# Patient Record
Sex: FEMALE | Race: WHITE | NOT HISPANIC OR LATINO
[De-identification: names, ages, dates, MRNs, and addresses within clinical notes are randomized per-mention and may not be internally consistent; named-entity substitution may affect disease eponyms.]

---

## 2017-08-16 ENCOUNTER — APPOINTMENT (OUTPATIENT)
Dept: OBGYN | Facility: CLINIC | Age: 57
End: 2017-08-16
Payer: COMMERCIAL

## 2017-08-16 VITALS
BODY MASS INDEX: 33.66 KG/M2 | HEART RATE: 80 BPM | HEIGHT: 63 IN | SYSTOLIC BLOOD PRESSURE: 142 MMHG | WEIGHT: 190 LBS | DIASTOLIC BLOOD PRESSURE: 80 MMHG

## 2017-08-16 PROCEDURE — 82270 OCCULT BLOOD FECES: CPT

## 2017-08-16 PROCEDURE — 99396 PREV VISIT EST AGE 40-64: CPT

## 2017-08-18 ENCOUNTER — MESSAGE (OUTPATIENT)
Age: 57
End: 2017-08-18

## 2017-08-18 LAB — HPV HIGH+LOW RISK DNA PNL CVX: NEGATIVE

## 2017-08-20 ENCOUNTER — MESSAGE (OUTPATIENT)
Age: 57
End: 2017-08-20

## 2017-08-20 LAB — CYTOLOGY CVX/VAG DOC THIN PREP: NORMAL

## 2017-08-23 ENCOUNTER — APPOINTMENT (OUTPATIENT)
Dept: OBGYN | Facility: CLINIC | Age: 57
End: 2017-08-23
Payer: COMMERCIAL

## 2017-08-23 ENCOUNTER — ASOB RESULT (OUTPATIENT)
Age: 57
End: 2017-08-23

## 2017-08-23 PROCEDURE — 76830 TRANSVAGINAL US NON-OB: CPT

## 2017-08-24 ENCOUNTER — MESSAGE (OUTPATIENT)
Age: 57
End: 2017-08-24

## 2019-04-03 ENCOUNTER — APPOINTMENT (OUTPATIENT)
Dept: OBGYN | Facility: CLINIC | Age: 59
End: 2019-04-03
Payer: COMMERCIAL

## 2019-04-03 VITALS
BODY MASS INDEX: 35.44 KG/M2 | DIASTOLIC BLOOD PRESSURE: 80 MMHG | SYSTOLIC BLOOD PRESSURE: 130 MMHG | WEIGHT: 200 LBS | HEIGHT: 63 IN

## 2019-04-03 DIAGNOSIS — Z12.11 ENCOUNTER FOR SCREENING FOR MALIGNANT NEOPLASM OF COLON: ICD-10-CM

## 2019-04-03 DIAGNOSIS — Z12.12 ENCOUNTER FOR SCREENING FOR MALIGNANT NEOPLASM OF COLON: ICD-10-CM

## 2019-04-03 PROCEDURE — 82270 OCCULT BLOOD FECES: CPT

## 2019-04-03 PROCEDURE — 99396 PREV VISIT EST AGE 40-64: CPT

## 2019-04-03 NOTE — PHYSICAL EXAM
[Awake] : awake [Alert] : alert [Acute Distress] : no acute distress [Thyroid Nodule] : no thyroid nodule [Goiter] : no goiter [Mass] : no breast mass [Nipple Discharge] : no nipple discharge [Axillary LAD] : no axillary lymphadenopathy [Soft] : soft [Tender] : non tender [Oriented x3] : oriented to person, place, and time [Normal] : uterus [No Bleeding] : there was no active vaginal bleeding [Uterine Adnexae] : were not tender and not enlarged [No Tenderness] : no rectal tenderness [Occult Blood] : occult blood test from digital rectal exam was negative [Nl Sphincter Tone] : normal sphincter tone [External Hemorrhoid] : an external hemorrhoid [RRR, No Murmurs] : RRR, no murmurs [CTAB] : CTAB

## 2019-04-05 ENCOUNTER — MESSAGE (OUTPATIENT)
Age: 59
End: 2019-04-05

## 2019-04-05 LAB — HPV HIGH+LOW RISK DNA PNL CVX: NOT DETECTED

## 2019-04-06 ENCOUNTER — MESSAGE (OUTPATIENT)
Age: 59
End: 2019-04-06

## 2019-04-06 LAB — CYTOLOGY CVX/VAG DOC THIN PREP: NORMAL

## 2023-05-03 ENCOUNTER — APPOINTMENT (OUTPATIENT)
Dept: OBGYN | Facility: CLINIC | Age: 63
End: 2023-05-03
Payer: MEDICARE

## 2023-05-03 VITALS
SYSTOLIC BLOOD PRESSURE: 122 MMHG | WEIGHT: 200 LBS | BODY MASS INDEX: 35.44 KG/M2 | HEIGHT: 63 IN | DIASTOLIC BLOOD PRESSURE: 80 MMHG

## 2023-05-03 DIAGNOSIS — Z01.419 ENCOUNTER FOR GYNECOLOGICAL EXAMINATION (GENERAL) (ROUTINE) W/OUT ABNORMAL FINDINGS: ICD-10-CM

## 2023-05-03 PROCEDURE — G0101: CPT

## 2023-05-03 PROCEDURE — 99386 PREV VISIT NEW AGE 40-64: CPT | Mod: GY

## 2023-05-04 LAB — HPV HIGH+LOW RISK DNA PNL CVX: NOT DETECTED

## 2023-05-09 LAB — CYTOLOGY CVX/VAG DOC THIN PREP: NORMAL

## 2024-08-27 ENCOUNTER — APPOINTMENT (OUTPATIENT)
Dept: CARDIOLOGY | Facility: CLINIC | Age: 64
End: 2024-08-27
Payer: MEDICARE

## 2024-08-27 DIAGNOSIS — I83.90 ASYMPTOMATIC VARICOSE VEINS OF UNSPECIFIED LOWER EXTREMITY: ICD-10-CM

## 2024-08-27 PROCEDURE — 99203 OFFICE O/P NEW LOW 30 MIN: CPT

## 2024-08-27 NOTE — REASON FOR VISIT
[Initial Evaluation] : an initial evaluation of [FreeTextEntry2] : vascular evaluation [FreeTextEntry1] : 8/27/2024  65 y/o F with PMHX Gastric Surgery For Morbid Obesity, who presents for evaluation. Smoker 1 PPD leg swelling for the past month, bilateral, now better palp pedals on exam VV on the post calf R>L skin is try not wearing compression no meds  Prior duplex in 2019 with ruptured pop cyst

## 2024-08-27 NOTE — ASSESSMENT
[FreeTextEntry1] : Assessment: 1.  Improving bilateral edema 2.  Palp pedal pulses 3.  Smoker 4.  Lung nodules  Plan 1.  Initiate conservative measures for varicose veins 2.  Trial of Copper fit compression garments 3.  Wear compression first thing in the morning, and take off before going to bed 4.  Discussed the importance of healthy diet, weight loss and leg elevation 5.  Age appropriate cancer screening with primary care 6.  Lower extremity venous duplex to rule out DVT  7.  Return in 3 months to re-assess.

## 2024-09-20 ENCOUNTER — APPOINTMENT (OUTPATIENT)
Dept: CT IMAGING | Facility: CLINIC | Age: 64
End: 2024-09-20

## 2025-05-21 ENCOUNTER — INPATIENT (INPATIENT)
Facility: HOSPITAL | Age: 65
LOS: 8 days | Discharge: NURSING, REHAB OR LONG-TERM CARE | DRG: 897 | End: 2025-05-30
Attending: HOSPITALIST | Admitting: STUDENT IN AN ORGANIZED HEALTH CARE EDUCATION/TRAINING PROGRAM
Payer: MEDICARE

## 2025-05-21 VITALS
TEMPERATURE: 97 F | OXYGEN SATURATION: 100 % | WEIGHT: 160.06 LBS | SYSTOLIC BLOOD PRESSURE: 102 MMHG | HEART RATE: 74 BPM | HEIGHT: 64 IN | RESPIRATION RATE: 18 BRPM | DIASTOLIC BLOOD PRESSURE: 68 MMHG

## 2025-05-21 LAB
ALBUMIN SERPL ELPH-MCNC: 2.5 G/DL — LOW (ref 3.3–5)
ALP SERPL-CCNC: 332 U/L — HIGH (ref 40–120)
ALT FLD-CCNC: 90 U/L — HIGH (ref 12–78)
ANION GAP SERPL CALC-SCNC: 9 MMOL/L — SIGNIFICANT CHANGE UP (ref 5–17)
APPEARANCE UR: CLEAR — SIGNIFICANT CHANGE UP
APTT BLD: 27.9 SEC — SIGNIFICANT CHANGE UP (ref 26.1–36.8)
AST SERPL-CCNC: 168 U/L — HIGH (ref 15–37)
BASOPHILS # BLD AUTO: 0.02 K/UL — SIGNIFICANT CHANGE UP (ref 0–0.2)
BASOPHILS NFR BLD AUTO: 0.3 % — SIGNIFICANT CHANGE UP (ref 0–2)
BILIRUB SERPL-MCNC: 1.6 MG/DL — HIGH (ref 0.2–1.2)
BILIRUB UR-MCNC: NEGATIVE — SIGNIFICANT CHANGE UP
BUN SERPL-MCNC: 7 MG/DL — SIGNIFICANT CHANGE UP (ref 7–23)
CALCIUM SERPL-MCNC: 9.2 MG/DL — SIGNIFICANT CHANGE UP (ref 8.5–10.1)
CHLORIDE SERPL-SCNC: 94 MMOL/L — LOW (ref 96–108)
CO2 SERPL-SCNC: 30 MMOL/L — SIGNIFICANT CHANGE UP (ref 22–31)
COLOR SPEC: SIGNIFICANT CHANGE UP
CREAT SERPL-MCNC: 0.51 MG/DL — SIGNIFICANT CHANGE UP (ref 0.5–1.3)
DIFF PNL FLD: NEGATIVE — SIGNIFICANT CHANGE UP
EGFR: 103 ML/MIN/1.73M2 — SIGNIFICANT CHANGE UP
EGFR: 103 ML/MIN/1.73M2 — SIGNIFICANT CHANGE UP
EOSINOPHIL # BLD AUTO: 0.15 K/UL — SIGNIFICANT CHANGE UP (ref 0–0.5)
EOSINOPHIL NFR BLD AUTO: 2.2 % — SIGNIFICANT CHANGE UP (ref 0–6)
ETHANOL SERPL-MCNC: <10 MG/DL — SIGNIFICANT CHANGE UP (ref 0–10)
GLUCOSE SERPL-MCNC: 85 MG/DL — SIGNIFICANT CHANGE UP (ref 70–99)
GLUCOSE UR QL: NEGATIVE MG/DL — SIGNIFICANT CHANGE UP
HCT VFR BLD CALC: 29.2 % — LOW (ref 34.5–45)
HGB BLD-MCNC: 10.6 G/DL — LOW (ref 11.5–15.5)
IMM GRANULOCYTES NFR BLD AUTO: 0.6 % — SIGNIFICANT CHANGE UP (ref 0–0.9)
KETONES UR QL: ABNORMAL MG/DL
LEUKOCYTE ESTERASE UR-ACNC: ABNORMAL
LIDOCAIN IGE QN: 43 U/L — SIGNIFICANT CHANGE UP (ref 13–75)
LYMPHOCYTES # BLD AUTO: 1.53 K/UL — SIGNIFICANT CHANGE UP (ref 1–3.3)
LYMPHOCYTES # BLD AUTO: 22.9 % — SIGNIFICANT CHANGE UP (ref 13–44)
MAGNESIUM SERPL-MCNC: 1.8 MG/DL — SIGNIFICANT CHANGE UP (ref 1.6–2.6)
MCHC RBC-ENTMCNC: 36.3 G/DL — HIGH (ref 32–36)
MCHC RBC-ENTMCNC: 39.8 PG — HIGH (ref 27–34)
MCV RBC AUTO: 109.8 FL — HIGH (ref 80–100)
MONOCYTES # BLD AUTO: 0.51 K/UL — SIGNIFICANT CHANGE UP (ref 0–0.9)
MONOCYTES NFR BLD AUTO: 7.6 % — SIGNIFICANT CHANGE UP (ref 2–14)
NEUTROPHILS # BLD AUTO: 4.44 K/UL — SIGNIFICANT CHANGE UP (ref 1.8–7.4)
NEUTROPHILS NFR BLD AUTO: 66.4 % — SIGNIFICANT CHANGE UP (ref 43–77)
NITRITE UR-MCNC: NEGATIVE — SIGNIFICANT CHANGE UP
NRBC BLD AUTO-RTO: 0 /100 WBCS — SIGNIFICANT CHANGE UP (ref 0–0)
PH UR: 7 — SIGNIFICANT CHANGE UP (ref 5–8)
PLATELET # BLD AUTO: 142 K/UL — LOW (ref 150–400)
POTASSIUM SERPL-MCNC: 3.4 MMOL/L — LOW (ref 3.5–5.3)
POTASSIUM SERPL-SCNC: 3.4 MMOL/L — LOW (ref 3.5–5.3)
PROT SERPL-MCNC: 5.4 G/DL — LOW (ref 6–8.3)
PROT UR-MCNC: NEGATIVE MG/DL — SIGNIFICANT CHANGE UP
RBC # BLD: 2.66 M/UL — LOW (ref 3.8–5.2)
RBC # FLD: 14 % — SIGNIFICANT CHANGE UP (ref 10.3–14.5)
SODIUM SERPL-SCNC: 133 MMOL/L — LOW (ref 135–145)
SP GR SPEC: 1.01 — SIGNIFICANT CHANGE UP (ref 1–1.03)
UROBILINOGEN FLD QL: >=8 MG/DL (ref 0.2–1)
WBC # BLD: 6.69 K/UL — SIGNIFICANT CHANGE UP (ref 3.8–10.5)
WBC # FLD AUTO: 6.69 K/UL — SIGNIFICANT CHANGE UP (ref 3.8–10.5)

## 2025-05-21 PROCEDURE — 93010 ELECTROCARDIOGRAM REPORT: CPT

## 2025-05-21 PROCEDURE — 99285 EMERGENCY DEPT VISIT HI MDM: CPT

## 2025-05-21 PROCEDURE — 71045 X-RAY EXAM CHEST 1 VIEW: CPT | Mod: 26

## 2025-05-21 PROCEDURE — 99223 1ST HOSP IP/OBS HIGH 75: CPT | Mod: GC

## 2025-05-21 RX ORDER — LORAZEPAM 4 MG/ML
2 VIAL (ML) INJECTION ONCE
Refills: 0 | Status: DISCONTINUED | OUTPATIENT
Start: 2025-05-21 | End: 2025-05-21

## 2025-05-21 RX ORDER — SODIUM CHLORIDE 9 G/1000ML
1000 INJECTION, SOLUTION INTRAVENOUS ONCE
Refills: 0 | Status: COMPLETED | OUTPATIENT
Start: 2025-05-21 | End: 2025-05-21

## 2025-05-21 RX ORDER — CHLORDIAZEPOXIDE HCL 10 MG
50 CAPSULE ORAL ONCE
Refills: 0 | Status: DISCONTINUED | OUTPATIENT
Start: 2025-05-21 | End: 2025-05-21

## 2025-05-21 RX ADMIN — Medication 50 MILLIGRAM(S): at 23:36

## 2025-05-21 RX ADMIN — Medication 2 MILLIGRAM(S): at 21:24

## 2025-05-21 RX ADMIN — SODIUM CHLORIDE 1000 MILLILITER(S): 9 INJECTION, SOLUTION INTRAVENOUS at 23:36

## 2025-05-21 NOTE — ED ADULT NURSE NOTE - NSFALLRISKINTERV_ED_ALL_ED
Assistance OOB with selected safe patient handling equipment if applicable/Assistance with ambulation/Communicate fall risk and risk factors to all staff, patient, and family/Monitor gait and stability/Provide visual cue: yellow wristband, yellow gown, etc/Reinforce activity limits and safety measures with patient and family/Call bell, personal items and telephone in reach/Instruct patient to call for assistance before getting out of bed/chair/stretcher/Non-slip footwear applied when patient is off stretcher/[place] to call system/Physically safe environment - no spills, clutter or unnecessary equipment/Purposeful Proactive Rounding/Room/bathroom lighting operational, light cord in reach

## 2025-05-21 NOTE — ED PROVIDER NOTE - OBJECTIVE STATEMENT
[Consultation] : a consultation visit [Hypothyroidism] : hypothyroidism 65 F denies significant PMHx, poor compliance with medical care, hx c-sections, gastric bypass >20 years ago, 1ppd smoker, daily etoh (last drink yesterday, tenisha but more recently beer), presents to ED due to BLE swelling and weakness. Denies f/c, cp, sob, vomiting, diarrhea, urinary complaints. Reports was seen @ [facility] Nov 2024 but unable to specify what was found or done.

## 2025-05-21 NOTE — ED ADULT TRIAGE NOTE - CHIEF COMPLAINT QUOTE
BIBA from home per EMS pt with increased b/l lower extremity edema x 1 month with accompanied weakness

## 2025-05-21 NOTE — ED PROVIDER NOTE - ATTENDING APP SHARED VISIT CONTRIBUTION OF CARE
This was a shared visit with NOE. I reviewed and verified the documentation and independently performed the documented MDM.

## 2025-05-21 NOTE — ED PROVIDER NOTE - NEUROLOGICAL, MLM
awake, alert, nonfocal, tremulous Partially impaired: cannot see medication labels or newsprint, but can see obstacles in path, and the surrounding layout; can count fingers at arm's length

## 2025-05-21 NOTE — ED PROVIDER NOTE - CLINICAL SUMMARY MEDICAL DECISION MAKING FREE TEXT BOX
Here with lower extremity edema in setting of alcohol abuse and low interaction with medical field.  Differential inclusive of CHF, kidney/liver failure, alcohol withdrawal, malnutrition with low albumin, fluid overload.  Check labs, x-ray, give benzos with withdrawal, anticipate admission.

## 2025-05-21 NOTE — ED ADULT NURSE NOTE - OBJECTIVE STATEMENT
Pt Stated she has been feeling very tired, and very shaky with left chest wall palpitations for 6 months. Denies Chest pain or sob. Denies fever or chills. denies nausea or vomiting. "drank a little alcohol today"

## 2025-05-21 NOTE — ED PROVIDER NOTE - PROGRESS NOTE DETAILS
LFTs abnormal, concern for cholesterol and liver failure.  Will admit for further workup and management.  Still with some shaking will give Librium.  Patient also with soft blood pressure, will give fluids.

## 2025-05-22 DIAGNOSIS — Z29.9 ENCOUNTER FOR PROPHYLACTIC MEASURES, UNSPECIFIED: ICD-10-CM

## 2025-05-22 DIAGNOSIS — F10.939 ALCOHOL USE, UNSPECIFIED WITH WITHDRAWAL, UNSPECIFIED: ICD-10-CM

## 2025-05-22 DIAGNOSIS — R60.0 LOCALIZED EDEMA: ICD-10-CM

## 2025-05-22 DIAGNOSIS — F10.239 ALCOHOL DEPENDENCE WITH WITHDRAWAL, UNSPECIFIED: ICD-10-CM

## 2025-05-22 LAB — NT-PROBNP SERPL-SCNC: 614 PG/ML — HIGH (ref 0–125)

## 2025-05-22 PROCEDURE — 93970 EXTREMITY STUDY: CPT | Mod: 26

## 2025-05-22 PROCEDURE — 99232 SBSQ HOSP IP/OBS MODERATE 35: CPT

## 2025-05-22 RX ORDER — DIAZEPAM 5 MG/1
2 TABLET ORAL EVERY 12 HOURS
Refills: 0 | Status: DISCONTINUED | OUTPATIENT
Start: 2025-05-24 | End: 2025-05-25

## 2025-05-22 RX ORDER — LORAZEPAM 4 MG/ML
4 VIAL (ML) INJECTION
Refills: 0 | Status: DISCONTINUED | OUTPATIENT
Start: 2025-05-22 | End: 2025-05-22

## 2025-05-22 RX ORDER — FOLIC ACID 1 MG/1
1 TABLET ORAL DAILY
Refills: 0 | Status: DISCONTINUED | OUTPATIENT
Start: 2025-05-22 | End: 2025-05-30

## 2025-05-22 RX ORDER — ENOXAPARIN SODIUM 100 MG/ML
40 INJECTION SUBCUTANEOUS EVERY 24 HOURS
Refills: 0 | Status: DISCONTINUED | OUTPATIENT
Start: 2025-05-22 | End: 2025-05-25

## 2025-05-22 RX ORDER — DIAZEPAM 5 MG/1
5 TABLET ORAL EVERY 6 HOURS
Refills: 0 | Status: DISCONTINUED | OUTPATIENT
Start: 2025-05-22 | End: 2025-05-23

## 2025-05-22 RX ORDER — LORAZEPAM 4 MG/ML
VIAL (ML) INJECTION
Refills: 0 | Status: DISCONTINUED | OUTPATIENT
Start: 2025-05-22 | End: 2025-05-22

## 2025-05-22 RX ORDER — DIAZEPAM 5 MG/1
5 TABLET ORAL
Refills: 0 | Status: DISCONTINUED | OUTPATIENT
Start: 2025-05-22 | End: 2025-05-22

## 2025-05-22 RX ORDER — DIAZEPAM 5 MG/1
10 TABLET ORAL
Refills: 0 | Status: DISCONTINUED | OUTPATIENT
Start: 2025-05-22 | End: 2025-05-22

## 2025-05-22 RX ORDER — NYSTATIN 100000 [USP'U]/G
1 CREAM TOPICAL
Refills: 0 | Status: DISCONTINUED | OUTPATIENT
Start: 2025-05-22 | End: 2025-05-30

## 2025-05-22 RX ORDER — LORAZEPAM 4 MG/ML
2 VIAL (ML) INJECTION
Refills: 0 | Status: DISCONTINUED | OUTPATIENT
Start: 2025-05-22 | End: 2025-05-22

## 2025-05-22 RX ORDER — DIAZEPAM 5 MG/1
5 TABLET ORAL EVERY 8 HOURS
Refills: 0 | Status: DISCONTINUED | OUTPATIENT
Start: 2025-05-23 | End: 2025-05-24

## 2025-05-22 RX ORDER — SODIUM CHLORIDE 9 G/1000ML
500 INJECTION, SOLUTION INTRAVENOUS ONCE
Refills: 0 | Status: COMPLETED | OUTPATIENT
Start: 2025-05-22 | End: 2025-05-22

## 2025-05-22 RX ORDER — B1/B2/B3/B5/B6/B12/VIT C/FOLIC 500-0.5 MG
1 TABLET ORAL DAILY
Refills: 0 | Status: DISCONTINUED | OUTPATIENT
Start: 2025-05-22 | End: 2025-05-30

## 2025-05-22 RX ORDER — DIAZEPAM 5 MG/1
TABLET ORAL
Refills: 0 | Status: COMPLETED | OUTPATIENT
Start: 2025-05-22 | End: 2025-05-25

## 2025-05-22 RX ORDER — NICOTINE POLACRILEX 4 MG/1
1 GUM, CHEWING ORAL DAILY
Refills: 0 | Status: DISCONTINUED | OUTPATIENT
Start: 2025-05-22 | End: 2025-05-30

## 2025-05-22 RX ADMIN — NYSTATIN 1 APPLICATION(S): 100000 CREAM TOPICAL at 17:35

## 2025-05-22 RX ADMIN — NICOTINE POLACRILEX 1 PATCH: 4 GUM, CHEWING ORAL at 20:38

## 2025-05-22 RX ADMIN — Medication 100 MILLIGRAM(S): at 12:37

## 2025-05-22 RX ADMIN — NICOTINE POLACRILEX 1 PATCH: 4 GUM, CHEWING ORAL at 12:38

## 2025-05-22 RX ADMIN — DIAZEPAM 5 MILLIGRAM(S): 5 TABLET ORAL at 12:37

## 2025-05-22 RX ADMIN — Medication 1 TABLET(S): at 12:38

## 2025-05-22 RX ADMIN — ENOXAPARIN SODIUM 40 MILLIGRAM(S): 100 INJECTION SUBCUTANEOUS at 02:35

## 2025-05-22 RX ADMIN — DIAZEPAM 5 MILLIGRAM(S): 5 TABLET ORAL at 06:07

## 2025-05-22 RX ADMIN — FOLIC ACID 1 MILLIGRAM(S): 1 TABLET ORAL at 12:37

## 2025-05-22 RX ADMIN — DIAZEPAM 5 MILLIGRAM(S): 5 TABLET ORAL at 17:35

## 2025-05-22 RX ADMIN — SODIUM CHLORIDE 500 MILLILITER(S): 9 INJECTION, SOLUTION INTRAVENOUS at 04:07

## 2025-05-22 RX ADMIN — NYSTATIN 1 APPLICATION(S): 100000 CREAM TOPICAL at 06:08

## 2025-05-22 NOTE — H&P ADULT - NSHPSOCIALHISTORY_GEN_ALL_CORE
Tobacco: 1 ppd per day  Alcohol: 1 pint of tenisha a day  Illicit Drug Use: Denies  Ambulation: Independent  ADLs: Independent

## 2025-05-22 NOTE — PATIENT PROFILE ADULT - FUNCTIONAL ASSESSMENT - BASIC MOBILITY 6.
Not able to assess (calculate score using AMPAC averaging method) 1-calculated by average /Not able to assess (calculate score using [facility] averaging method)

## 2025-05-22 NOTE — H&P ADULT - PROBLEM SELECTOR PLAN 2
Pt presents with leg edema for the past month impeding walking ability  BNP: 614  CXR: No evidence of fluid overload  -TTE ordered, f/u  -US Duplex B/L, f/u  -PT consult, f/u recs

## 2025-05-22 NOTE — H&P ADULT - HISTORY OF PRESENT ILLNESS
66 y/o w/ no significant PMHx presents w/ alcohol withdrawal. Pt's  contacted over the phone and provided much of the hx. Pt's  endorses that pt has become weaker over the past several months in conjunction with weakness. Pt's  also states that pt's legs have become swollen over the past month in that it has impedes her ability to walk but she is still able to walk unassisted. Pt's  says last time she had a drink was two days ago of which was a pint of tenisha (she has been drinking every day for 20 yrs). Pt's  states he found her on the floor today, unable to get up with tremors so she was brought to [facility].      IN THE ED:  Temp 97.2F, HR 74, /68, RR 18, SpO2 100% on 4L  S/P 2 mg ativan, 50 mg librium, 1L LRs  Labs significant for H/H: 10.6/29.2, MCV: 110, Na: 133, K: 3.4, T Bili: 1.6, Alk Phos: 332, AST/ALT: 168/90  Imaging:  CXR: No radiographic evidence of active chest disease

## 2025-05-22 NOTE — PROGRESS NOTE ADULT - PROBLEM SELECTOR PLAN 1
Pt presents with B/L tremors and confusion - REG  -Transaminitis: Alk Phos: 332. AST/ALT: 168/90 - trend CMP  -CIWA protocol initiated: Valium 5 mg (PRN CIWA 8-14). Valium 10 mg (PRN CIWA >15). Valium taper  -Folic acid, MVA, thiamine  -Alcohol cessation  -Addiction medicine (Dr. Valdovinos), consulted

## 2025-05-22 NOTE — PROGRESS NOTE ADULT - PROBLEM SELECTOR PLAN 2
Pt presents with leg edema for the past month impeding walking ability  BNP: 614  CXR: No evidence of fluid overload  -TTE ordered, pending  -US Duplex B/L negative  -PT consult, f/u recs

## 2025-05-22 NOTE — ED ADULT NURSE REASSESSMENT NOTE - NSFALLRISKINTERV_ED_ALL_ED
Assistance OOB with selected safe patient handling equipment if applicable/Assistance with ambulation/Communicate fall risk and risk factors to all staff, patient, and family/Monitor gait and stability/Provide visual cue: yellow wristband, yellow gown, etc/Reinforce activity limits and safety measures with patient and family/Call bell, personal items and telephone in reach/Instruct patient to call for assistance before getting out of bed/chair/stretcher/Non-slip footwear applied when patient is off stretcher/[place] to call system/Physically safe environment - no spills, clutter or unnecessary equipment/Purposeful Proactive Rounding/Room/bathroom lighting operational, light cord in reach

## 2025-05-22 NOTE — PROGRESS NOTE ADULT - SUBJECTIVE AND OBJECTIVE BOX
Patient is a 65y old  Female who presents with a chief complaint of Alcohol Withdrawal and Leg Edema (22 May 2025 10:27)      FROM ADMISSION H+P:   HPI:  66 y/o w/ no significant PMHx presents w/ alcohol withdrawal. Pt's  contacted over the phone and provided much of the hx. Pt's  endorses that pt has become weaker over the past several months in conjunction with weakness. Pt's  also states that pt's legs have become swollen over the past month in that it has impedes her ability to walk but she is still able to walk unassisted. Pt's  says last time she had a drink was two days ago of which was a pint of tenisha (she has been drinking every day for 20 yrs). Pt's  states he found her on the floor today, unable to get up with tremors so she was brought to [facility].      IN THE ED:  Temp 97.2F, HR 74, /68, RR 18, SpO2 100% on 4L  S/P 2 mg ativan, 50 mg librium, 1L LRs  Labs significant for H/H: 10.6/29.2, MCV: 110, Na: 133, K: 3.4, T Bili: 1.6, Alk Phos: 332, AST/ALT: 168/90  Imaging:  CXR: No radiographic evidence of active chest disease (22 May 2025 00:16)      INTERVAL HPI/OVERNIGHT EVENTS: Patient was seen and examined. No acute events occurred overnight. No new complaints.    I&O's Summary    22 May 2025 07:01  -  22 May 2025 22:58  --------------------------------------------------------  IN: 0 mL / OUT: 1100 mL / NET: -1100 mL        PAST MEDICAL & SURGICAL HISTORY:  No pertinent past medical history          LABS:                        10.6   6.69  )-----------( 142      ( 21 May 2025 20:20 )             29.2     05-21    133[L]  |  94[L]  |  7   ----------------------------<  85  3.4[L]   |  30  |  0.51    Ca    9.2      21 May 2025 21:47  Mg     1.8     05-21    TPro  5.4[L]  /  Alb  2.5[L]  /  TBili  1.6[H]  /  DBili  x   /  AST  168[H]  /  ALT  90[H]  /  AlkPhos  332[H]  05-21    PTT - ( 21 May 2025 21:47 )  PTT:27.9 sec  Urinalysis Basic - ( 21 May 2025 21:47 )    Color: x / Appearance: x / SG: x / pH: x  Gluc: 85 mg/dL / Ketone: x  / Bili: x / Urobili: x   Blood: x / Protein: x / Nitrite: x   Leuk Esterase: x / RBC: x / WBC x   Sq Epi: x / Non Sq Epi: x / Bacteria: x      CAPILLARY BLOOD GLUCOSE            Urinalysis Basic - ( 21 May 2025 21:47 )    Color: x / Appearance: x / SG: x / pH: x  Gluc: 85 mg/dL / Ketone: x  / Bili: x / Urobili: x   Blood: x / Protein: x / Nitrite: x   Leuk Esterase: x / RBC: x / WBC x   Sq Epi: x / Non Sq Epi: x / Bacteria: x        MEDICATIONS  (STANDING):  diazepam  Injectable 5 milliGRAM(s) IV Push every 6 hours  diazepam  Injectable   IV Push   enoxaparin Injectable 40 milliGRAM(s) SubCutaneous every 24 hours  folic acid 1 milliGRAM(s) Oral daily  multivitamin 1 Tablet(s) Oral daily  nicotine -  14 mG/24Hr(s) Patch 1 Patch Transdermal daily  nystatin Powder 1 Application(s) Topical two times a day  thiamine 100 milliGRAM(s) Oral daily    MEDICATIONS  (PRN):  diazepam  Injectable 5 milliGRAM(s) IV Push every 1 hour PRN CIWA 8-14  diazepam  Injectable 10 milliGRAM(s) IV Push every 1 hour PRN CIWA 15 or greater, or seizure      REVIEW OF SYSTEMS:  CONSTITUTIONAL: No fever or chills  HEENT:  No headache, no sore throat  RESPIRATORY: No cough, wheezing, or shortness of breath  CARDIOVASCULAR: No chest pain, palpitations  GASTROINTESTINAL: No abdominal pain, nausea, vomiting, or diarrhea  GENITOURINARY: No dysuria, frequency, or hematuria  NEUROLOGICAL: no focal weakness or dizziness  MUSCULOSKELETAL: no myalgias  PSYCH: no recent changes in mood    RADIOLOGY & ADDITIONAL TESTS:    Imaging Personally Reviewed:  [x] YES  [ ] NO    Consultant(s) Notes Reviewed:  [x] YES  [ ] NO    PHYSICAL EXAM:  T(C): 36.6 (05-22-25 @ 21:48), Max: 37.1 (05-22-25 @ 05:22)  HR: 84 (05-22-25 @ 21:48) (67 - 84)  BP: 91/56 (05-22-25 @ 21:48) (91/56 - 98/68)  RR: 17 (05-22-25 @ 21:48) (17 - 18)  SpO2: 97% (05-22-25 @ 21:48) (94% - 100%)    GENERAL: NAD, well-developed, well-groomed  HEENT:  anicteric, moist mucous membranes  CHEST/LUNG:  CTA b/l, no rales, wheezes, or rhonchi  HEART:  RRR, S1, S2  ABDOMEN:  BS+, soft, nontender, nondistended  EXTREMITIES: no edema, cyanosis, or calf tenderness  NERVOUS SYSTEM: answers questions and follows commands appropriately  PSYCH: normal affect    Care Discussed with Consultants/Other Providers [x] YES  [ ] NO Patient is a 65y old  Female who presents with a chief complaint of Alcohol Withdrawal and Leg Edema (22 May 2025 10:27)      FROM ADMISSION H+P:   HPI:  64 y/o w/ no significant PMHx presents w/ alcohol withdrawal. Pt's  contacted over the phone and provided much of the hx. Pt's  endorses that pt has become weaker over the past several months in conjunction with weakness. Pt's  also states that pt's legs have become swollen over the past month in that it has impedes her ability to walk but she is still able to walk unassisted. Pt's  says last time she had a drink was two days ago of which was a pint of tenisha (she has been drinking every day for 20 yrs). Pt's  states he found her on the floor today, unable to get up with tremors so she was brought to [facility].      IN THE ED:  Temp 97.2F, HR 74, /68, RR 18, SpO2 100% on 4L  S/P 2 mg ativan, 50 mg librium, 1L LRs  Labs significant for H/H: 10.6/29.2, MCV: 110, Na: 133, K: 3.4, T Bili: 1.6, Alk Phos: 332, AST/ALT: 168/90  Imaging:  CXR: No radiographic evidence of active chest disease (22 May 2025 00:16)      INTERVAL HPI/OVERNIGHT EVENTS: Patient was seen and examined. No acute events occurred overnight. No new complaints. Laying in bed. Reports seeing the new MD at [facility] - does remember getting outpatient echo. States that she has some shakes.    I&O's Summary    22 May 2025 07:01  -  22 May 2025 22:58  --------------------------------------------------------  IN: 0 mL / OUT: 1100 mL / NET: -1100 mL        PAST MEDICAL & SURGICAL HISTORY:  No pertinent past medical history          LABS:                        10.6   6.69  )-----------( 142      ( 21 May 2025 20:20 )             29.2     05-21    133[L]  |  94[L]  |  7   ----------------------------<  85  3.4[L]   |  30  |  0.51    Ca    9.2      21 May 2025 21:47  Mg     1.8     05-21    TPro  5.4[L]  /  Alb  2.5[L]  /  TBili  1.6[H]  /  DBili  x   /  AST  168[H]  /  ALT  90[H]  /  AlkPhos  332[H]  05-21    PTT - ( 21 May 2025 21:47 )  PTT:27.9 sec  Urinalysis Basic - ( 21 May 2025 21:47 )    Color: x / Appearance: x / SG: x / pH: x  Gluc: 85 mg/dL / Ketone: x  / Bili: x / Urobili: x   Blood: x / Protein: x / Nitrite: x   Leuk Esterase: x / RBC: x / WBC x   Sq Epi: x / Non Sq Epi: x / Bacteria: x      CAPILLARY BLOOD GLUCOSE            Urinalysis Basic - ( 21 May 2025 21:47 )    Color: x / Appearance: x / SG: x / pH: x  Gluc: 85 mg/dL / Ketone: x  / Bili: x / Urobili: x   Blood: x / Protein: x / Nitrite: x   Leuk Esterase: x / RBC: x / WBC x   Sq Epi: x / Non Sq Epi: x / Bacteria: x        MEDICATIONS  (STANDING):  diazepam  Injectable 5 milliGRAM(s) IV Push every 6 hours  diazepam  Injectable   IV Push   enoxaparin Injectable 40 milliGRAM(s) SubCutaneous every 24 hours  folic acid 1 milliGRAM(s) Oral daily  multivitamin 1 Tablet(s) Oral daily  nicotine -  14 mG/24Hr(s) Patch 1 Patch Transdermal daily  nystatin Powder 1 Application(s) Topical two times a day  thiamine 100 milliGRAM(s) Oral daily    MEDICATIONS  (PRN):  diazepam  Injectable 5 milliGRAM(s) IV Push every 1 hour PRN CIWA 8-14  diazepam  Injectable 10 milliGRAM(s) IV Push every 1 hour PRN CIWA 15 or greater, or seizure      REVIEW OF SYSTEMS:  CONSTITUTIONAL: No fever or chills; +weakness  HEENT:  No headache, no sore throat  RESPIRATORY: No cough, wheezing, or shortness of breath  CARDIOVASCULAR: No chest pain, palpitations  GASTROINTESTINAL: No abdominal pain, nausea, vomiting, or diarrhea  GENITOURINARY: No dysuria, frequency, or hematuria  NEUROLOGICAL: no focal weakness or dizziness; +tremors  MUSCULOSKELETAL: no myalgias  PSYCH: no recent changes in mood    RADIOLOGY & ADDITIONAL TESTS:  Dopplers: no DVT seen    Imaging Personally Reviewed:  [x] YES  [ ] NO    Consultant(s) Notes Reviewed:  [x] YES  [ ] NO    PHYSICAL EXAM:  T(C): 36.6 (05-22-25 @ 21:48), Max: 37.1 (05-22-25 @ 05:22)  HR: 84 (05-22-25 @ 21:48) (67 - 84)  BP: 91/56 (05-22-25 @ 21:48) (91/56 - 98/68)  RR: 17 (05-22-25 @ 21:48) (17 - 18)  SpO2: 97% (05-22-25 @ 21:48) (94% - 100%)    GENERAL: NAD  HEENT:  anicteric, moist mucous membranes  CHEST/LUNG:  CTA b/l, no rales, wheezes, or rhonchi  HEART:  RRR, S1, S2  ABDOMEN:  BS+, soft, nontender, nondistended  EXTREMITIES: no edema, cyanosis, or calf tenderness  NERVOUS SYSTEM: answers questions and follows commands appropriately  PSYCH: normal affect    Care Discussed with Consultants/Other Providers [x] YES  [ ] NO

## 2025-05-22 NOTE — H&P ADULT - NSHPREVIEWOFSYSTEMS_GEN_ALL_CORE
REVIEW OF SYSTEMS:  CONSTITUTIONAL: No fever/chills  HENMT: (+) HA  RESPIRATORY: No shortness of breath.  CARDIOVASCULAR: No chest pain, palpitations.  GASTROINTESTINAL: No abdominal or epigastric pain. No nausea or vomiting; No diarrhea or constipation.  NEUROLOGICAL: Tremors (B/L)

## 2025-05-22 NOTE — H&P ADULT - NSHPPHYSICALEXAM_GEN_ALL_CORE
CONSTITUTIONAL: Well groomed, no apparent distress  HEENT: EOMI and PERRLA  RESP: No respiratory distress, no use of accessory muscles; CTA b/l, no WRR  CV: RRR, +S1S2, 1+ pitting edema  GI: Soft, NT, ND  NEURO: 5/5 UE. Tremors appreciated B/L  PSYCH: Confabulating at bedside but was able to answer yes and no questions

## 2025-05-22 NOTE — CONSULT NOTE ADULT - ASSESSMENT
64 y/o w/ no significant PMHx presents w/ alcohol withdrawal. Pt's  contacted over the phone and provided much of the hx. Pt's  endorses that pt has become weaker over the past several months in conjunction with weakness. Pt's  also states that pt's legs have become swollen over the past month in that it has impedes her ability to walk but she is still able to walk unassisted    ashley  weakness  depression  poor historian  anemia  hepatitis    ciwa  valium detox  folic acid  thiamine  SBIRT documentation  assist with needs  monitor mentation  oral hygiene  skin care  trend LFT  nutritional status assessment  CXR on admission [facility]  trinidad roth  emotional support

## 2025-05-22 NOTE — PATIENT PROFILE ADULT - NSTRANSFERBELONGINGSRESP_GEN_A_NUR
FAMILY HISTORY:  Father  Still living? No  FH: heart disease, Age at diagnosis: Age Unknown    Sibling  Still living? Yes, Estimated age: Age Unknown  FH: uterine cancer, Age at diagnosis: Age Unknown     yes

## 2025-05-22 NOTE — H&P ADULT - PROBLEM SELECTOR PLAN 1
Pt presents with B/L tremors and confusion  -Transaminitis: Alk Phos: 332. AST/ALT: 168/90  -s/p 2 mg ativan and 50 mg Librium in the ED  -CIWA protocol initiated: Valium 5 mg (PRN CIWA 8-14). Valium 10 mg (PRN CIWA >15). Valium taper  -Addiction medicine (Dr. Valdovinos), consulted Pt presents with B/L tremors and confusion  -Transaminitis: Alk Phos: 332. AST/ALT: 168/90  -s/p 2 mg ativan and 50 mg Librium in the ED  -CIWA protocol initiated: Valium 5 mg (PRN CIWA 8-14). Valium 10 mg (PRN CIWA >15). Valium taper  -Folic acid, MVA, thiamine  -Addiction medicine (Dr. Valdovinos), consulted

## 2025-05-22 NOTE — H&P ADULT - ATTENDING COMMENTS
66 y/o w/ no significant PMHx presents w/ alcohol withdrawal and leg edema.     #Alcohol withdrawal  Pt presents with B/L tremors and confusion  -Transaminitis: Alk Phos: 332. AST/ALT: 168/90  -s/p 2 mg ativan and 50 mg Librium in the ED  -CIWA protocol initiated: Valium 5 mg (PRN CIWA 8-14). Valium 10 mg (PRN CIWA >15). Valium taper  -Folic acid, MVA, thiamine  -Addiction medicine (Dr. Valdovinos), consulted    #Leg Edema, Bilateral  Pt presents with leg edema for the past month impeding walking ability  BNP: 614  CXR: No evidence of fluid overload  -TTE ordered, f/u  -US Duplex B/L, f/u  -PT consult, f/u recs      Agree with Resident's Note. Refer to resident's note for chronic comorbidities  76 minutes spent on total encounter excluding teaching and separately reported services. The necessity of the time spent during the encounter on this date of service was due to:   activities including direct patient care, counseling and/or coordinating care, and reviewing notes/lab data/imaging.

## 2025-05-22 NOTE — H&P ADULT - ASSESSMENT
64 y/o w/ no significant PMHx presents w/ alcohol withdrawal and leg edema.  66 y/o w/ no significant PMHx presents w/ alcohol withdrawal and leg edema.

## 2025-05-22 NOTE — PATIENT PROFILE ADULT - FALL HARM RISK - HARM RISK INTERVENTIONS
Assistance with ambulation/Assistance OOB with selected safe patient handling equipment/Communicate Risk of Fall with Harm to all staff/Monitor for mental status changes/Monitor gait and stability/Reinforce activity limits and safety measures with patient and family/Tailored Fall Risk Interventions/Toileting schedule using arm’s reach rule for commode and bathroom/Use of alarms - bed, chair and/or voice tab/Visual Cue: Yellow wristband and red socks/Bed in lowest position, wheels locked, appropriate side rails in place/Call bell, personal items and telephone in reach/Instruct patient to call for assistance before getting out of bed or chair/Non-slip footwear when patient is out of bed/[place] to call system/Physically safe environment - no spills, clutter or unnecessary equipment/Purposeful Proactive Rounding/Room/bathroom lighting operational, light cord in reach

## 2025-05-23 DIAGNOSIS — E87.6 HYPOKALEMIA: ICD-10-CM

## 2025-05-23 DIAGNOSIS — D64.9 ANEMIA, UNSPECIFIED: ICD-10-CM

## 2025-05-23 LAB
ALBUMIN SERPL ELPH-MCNC: 2.2 G/DL — LOW (ref 3.3–5)
ALP SERPL-CCNC: 279 U/L — HIGH (ref 40–120)
ALT FLD-CCNC: 64 U/L — SIGNIFICANT CHANGE UP (ref 12–78)
ANION GAP SERPL CALC-SCNC: 5 MMOL/L — SIGNIFICANT CHANGE UP (ref 5–17)
ANION GAP SERPL CALC-SCNC: 7 MMOL/L — SIGNIFICANT CHANGE UP (ref 5–17)
AST SERPL-CCNC: 92 U/L — HIGH (ref 15–37)
BILIRUB SERPL-MCNC: 0.8 MG/DL — SIGNIFICANT CHANGE UP (ref 0.2–1.2)
BILIRUB SERPL-MCNC: 0.8 MG/DL — SIGNIFICANT CHANGE UP (ref 0.2–1.2)
BUN SERPL-MCNC: 8 MG/DL — SIGNIFICANT CHANGE UP (ref 7–23)
BUN SERPL-MCNC: 9 MG/DL — SIGNIFICANT CHANGE UP (ref 7–23)
CALCIUM SERPL-MCNC: 8.3 MG/DL — LOW (ref 8.5–10.1)
CALCIUM SERPL-MCNC: 8.5 MG/DL — SIGNIFICANT CHANGE UP (ref 8.5–10.1)
CHLORIDE SERPL-SCNC: 104 MMOL/L — SIGNIFICANT CHANGE UP (ref 96–108)
CHLORIDE SERPL-SCNC: 105 MMOL/L — SIGNIFICANT CHANGE UP (ref 96–108)
CO2 SERPL-SCNC: 27 MMOL/L — SIGNIFICANT CHANGE UP (ref 22–31)
CO2 SERPL-SCNC: 31 MMOL/L — SIGNIFICANT CHANGE UP (ref 22–31)
CREAT SERPL-MCNC: 0.43 MG/DL — LOW (ref 0.5–1.3)
CREAT SERPL-MCNC: 0.47 MG/DL — LOW (ref 0.5–1.3)
CREAT SERPL-MCNC: 0.48 MG/DL — LOW (ref 0.5–1.3)
EGFR: 104 ML/MIN/1.73M2 — SIGNIFICANT CHANGE UP
EGFR: 104 ML/MIN/1.73M2 — SIGNIFICANT CHANGE UP
EGFR: 105 ML/MIN/1.73M2 — SIGNIFICANT CHANGE UP
EGFR: 105 ML/MIN/1.73M2 — SIGNIFICANT CHANGE UP
EGFR: 107 ML/MIN/1.73M2 — SIGNIFICANT CHANGE UP
EGFR: 107 ML/MIN/1.73M2 — SIGNIFICANT CHANGE UP
GLUCOSE SERPL-MCNC: 106 MG/DL — HIGH (ref 70–99)
GLUCOSE SERPL-MCNC: 85 MG/DL — SIGNIFICANT CHANGE UP (ref 70–99)
HCT VFR BLD CALC: 24.9 % — LOW (ref 34.5–45)
HCT VFR BLD CALC: 25.7 % — LOW (ref 34.5–45)
HGB BLD-MCNC: 8.7 G/DL — LOW (ref 11.5–15.5)
HGB BLD-MCNC: 8.8 G/DL — LOW (ref 11.5–15.5)
INR BLD: 0.97 RATIO — SIGNIFICANT CHANGE UP (ref 0.85–1.16)
MAGNESIUM SERPL-MCNC: 2 MG/DL — SIGNIFICANT CHANGE UP (ref 1.6–2.6)
MCHC RBC-ENTMCNC: 34.2 G/DL — SIGNIFICANT CHANGE UP (ref 32–36)
MCHC RBC-ENTMCNC: 34.9 G/DL — SIGNIFICANT CHANGE UP (ref 32–36)
MCHC RBC-ENTMCNC: 39.5 PG — HIGH (ref 27–34)
MCHC RBC-ENTMCNC: 39.8 PG — HIGH (ref 27–34)
MCV RBC AUTO: 113.2 FL — HIGH (ref 80–100)
MCV RBC AUTO: 116.3 FL — HIGH (ref 80–100)
MELD SCORE WITH DIALYSIS: 20 POINTS — SIGNIFICANT CHANGE UP
MELD SCORE WITHOUT DIALYSIS: 6 POINTS — SIGNIFICANT CHANGE UP
NRBC BLD AUTO-RTO: 0 /100 WBCS — SIGNIFICANT CHANGE UP (ref 0–0)
NRBC BLD AUTO-RTO: 0 /100 WBCS — SIGNIFICANT CHANGE UP (ref 0–0)
PHOSPHATE SERPL-MCNC: 2.2 MG/DL — LOW (ref 2.5–4.5)
PLATELET # BLD AUTO: 137 K/UL — LOW (ref 150–400)
PLATELET # BLD AUTO: 148 K/UL — LOW (ref 150–400)
POTASSIUM SERPL-MCNC: 2.8 MMOL/L — CRITICAL LOW (ref 3.5–5.3)
POTASSIUM SERPL-MCNC: 3.5 MMOL/L — SIGNIFICANT CHANGE UP (ref 3.5–5.3)
POTASSIUM SERPL-SCNC: 2.8 MMOL/L — CRITICAL LOW (ref 3.5–5.3)
POTASSIUM SERPL-SCNC: 3.5 MMOL/L — SIGNIFICANT CHANGE UP (ref 3.5–5.3)
PROT SERPL-MCNC: 4.7 G/DL — LOW (ref 6–8.3)
PROTHROM AB SERPL-ACNC: 11.3 SEC — SIGNIFICANT CHANGE UP (ref 9.9–13.4)
RBC # BLD: 2.2 M/UL — LOW (ref 3.8–5.2)
RBC # BLD: 2.21 M/UL — LOW (ref 3.8–5.2)
RBC # FLD: 14.9 % — HIGH (ref 10.3–14.5)
RBC # FLD: 15.4 % — HIGH (ref 10.3–14.5)
SODIUM SERPL-SCNC: 138 MMOL/L — SIGNIFICANT CHANGE UP (ref 135–145)
SODIUM SERPL-SCNC: 141 MMOL/L — SIGNIFICANT CHANGE UP (ref 135–145)
SODIUM SERPL-SCNC: 141 MMOL/L — SIGNIFICANT CHANGE UP (ref 135–145)
WBC # BLD: 5.49 K/UL — SIGNIFICANT CHANGE UP (ref 3.8–10.5)
WBC # BLD: 6.02 K/UL — SIGNIFICANT CHANGE UP (ref 3.8–10.5)
WBC # FLD AUTO: 5.49 K/UL — SIGNIFICANT CHANGE UP (ref 3.8–10.5)
WBC # FLD AUTO: 6.02 K/UL — SIGNIFICANT CHANGE UP (ref 3.8–10.5)

## 2025-05-23 PROCEDURE — 99233 SBSQ HOSP IP/OBS HIGH 50: CPT

## 2025-05-23 RX ORDER — POTASSIUM PHOSPHATE, MONOBASIC POTASSIUM PHOSPHATE, DIBASIC INJECTION, 236; 224 MG/ML; MG/ML
15 SOLUTION, CONCENTRATE INTRAVENOUS ONCE
Refills: 0 | Status: COMPLETED | OUTPATIENT
Start: 2025-05-23 | End: 2025-05-23

## 2025-05-23 RX ORDER — MAGNESIUM SULFATE 500 MG/ML
2 SYRINGE (ML) INJECTION ONCE
Refills: 0 | Status: COMPLETED | OUTPATIENT
Start: 2025-05-23 | End: 2025-05-23

## 2025-05-23 RX ADMIN — Medication 40 MILLIEQUIVALENT(S): at 12:06

## 2025-05-23 RX ADMIN — ENOXAPARIN SODIUM 40 MILLIGRAM(S): 100 INJECTION SUBCUTANEOUS at 06:17

## 2025-05-23 RX ADMIN — NYSTATIN 1 APPLICATION(S): 100000 CREAM TOPICAL at 16:09

## 2025-05-23 RX ADMIN — FOLIC ACID 1 MILLIGRAM(S): 1 TABLET ORAL at 12:06

## 2025-05-23 RX ADMIN — Medication 1 TABLET(S): at 12:06

## 2025-05-23 RX ADMIN — DIAZEPAM 5 MILLIGRAM(S): 5 TABLET ORAL at 21:03

## 2025-05-23 RX ADMIN — Medication 40 MILLIEQUIVALENT(S): at 16:40

## 2025-05-23 RX ADMIN — Medication 40 MILLIEQUIVALENT(S): at 14:59

## 2025-05-23 RX ADMIN — DIAZEPAM 5 MILLIGRAM(S): 5 TABLET ORAL at 00:28

## 2025-05-23 RX ADMIN — Medication 25 GRAM(S): at 12:05

## 2025-05-23 RX ADMIN — POTASSIUM PHOSPHATE, MONOBASIC POTASSIUM PHOSPHATE, DIBASIC INJECTION, 62.5 MILLIMOLE(S): 236; 224 SOLUTION, CONCENTRATE INTRAVENOUS at 15:03

## 2025-05-23 RX ADMIN — NICOTINE POLACRILEX 1 PATCH: 4 GUM, CHEWING ORAL at 12:05

## 2025-05-23 RX ADMIN — DIAZEPAM 5 MILLIGRAM(S): 5 TABLET ORAL at 13:19

## 2025-05-23 RX ADMIN — Medication 100 MILLIGRAM(S): at 12:06

## 2025-05-23 RX ADMIN — NICOTINE POLACRILEX 1 PATCH: 4 GUM, CHEWING ORAL at 18:48

## 2025-05-23 RX ADMIN — NYSTATIN 1 APPLICATION(S): 100000 CREAM TOPICAL at 05:26

## 2025-05-23 RX ADMIN — NICOTINE POLACRILEX 1 PATCH: 4 GUM, CHEWING ORAL at 16:33

## 2025-05-23 NOTE — CARE COORDINATION ASSESSMENT. - NSDCPLANSERVICES_GEN_ALL_CORE
Met with patient and later with patient and significant other. She gave me permission to speak to him Rashawn Holguin . They live to together in a house. There are no stairs for patient to use. The patient declined to discuss and alcohol related information or discharge planning. S/p verbalized his concerns. Educated on role of  and discharge planner. Currently awaiting PT recommendations .  Social Work will continue to follow as needed

## 2025-05-23 NOTE — DIETITIAN INITIAL EVALUATION ADULT - ORAL INTAKE PTA/DIET HISTORY
Not able to obtain much hx from either pt or spouse.  Pt slightly confused and spouse on phone. Spouse reports pt is a good eater. No mention of wt loss.  Pt edentulous however she states she can eat solid food fine just doesn't eats meat ie. steak or tough cuts.

## 2025-05-23 NOTE — DIETITIAN INITIAL EVALUATION ADULT - OTHER INFO
"66 y/o w/ no significant PMHx presents w/ alcohol withdrawal. Pt's  contacted over the phone and provided much of the hx. Pt's  endorses that pt has become weaker over the past several months in conjunction with weakness. Pt's  also states that pt's legs have become swollen over the past month in that it has impedes her ability to walk but she is still able to walk unassisted. Pt's  says last time she had a drink was two days ago of which was a pint of tenisha (she has been drinking every day for 20 yrs). Pt's  states he found her on the floor today, unable to get up with tremors so she was brought to [facility]. "

## 2025-05-23 NOTE — PROGRESS NOTE ADULT - SUBJECTIVE AND OBJECTIVE BOX
Date/Time Patient Seen:  		  Referring MD:   Data Reviewed	       Patient is a 65y old  Female who presents with a chief complaint of Alcohol Withdrawal and Leg Edema (22 May 2025 20:58)      Subjective/HPI     PAST MEDICAL & SURGICAL HISTORY:  No pertinent past medical history          Medication list         MEDICATIONS  (STANDING):  diazepam  Injectable 5 milliGRAM(s) IV Push every 8 hours  diazepam  Injectable   IV Push   enoxaparin Injectable 40 milliGRAM(s) SubCutaneous every 24 hours  folic acid 1 milliGRAM(s) Oral daily  multivitamin 1 Tablet(s) Oral daily  nicotine -  14 mG/24Hr(s) Patch 1 Patch Transdermal daily  nystatin Powder 1 Application(s) Topical two times a day  thiamine 100 milliGRAM(s) Oral daily    MEDICATIONS  (PRN):  diazepam  Injectable 5 milliGRAM(s) IV Push every 1 hour PRN CIWA 8-14  diazepam  Injectable 10 milliGRAM(s) IV Push every 1 hour PRN CIWA 15 or greater, or seizure         Vitals log        ICU Vital Signs Last 24 Hrs  T(C): 36.6 (22 May 2025 21:48), Max: 37.1 (22 May 2025 05:22)  T(F): 97.9 (22 May 2025 21:48), Max: 98.7 (22 May 2025 05:22)  HR: 84 (22 May 2025 21:48) (67 - 84)  BP: 91/56 (22 May 2025 21:48) (91/56 - 98/68)  BP(mean): --  ABP: --  ABP(mean): --  RR: 17 (22 May 2025 21:48) (17 - 17)  SpO2: 97% (22 May 2025 21:48) (94% - 97%)    O2 Parameters below as of 22 May 2025 21:48  Patient On (Oxygen Delivery Method): room air                 Input and Output:  I&O's Detail    22 May 2025 07:01  -  23 May 2025 04:30  --------------------------------------------------------  IN:  Total IN: 0 mL    OUT:    Voided (mL): 1100 mL  Total OUT: 1100 mL    Total NET: -1100 mL          Lab Data                        10.6   6.69  )-----------( 142      ( 21 May 2025 20:20 )             29.2     05-21    133[L]  |  94[L]  |  7   ----------------------------<  85  3.4[L]   |  30  |  0.51    Ca    9.2      21 May 2025 21:47  Mg     1.8     05-21    TPro  5.4[L]  /  Alb  2.5[L]  /  TBili  1.6[H]  /  DBili  x   /  AST  168[H]  /  ALT  90[H]  /  AlkPhos  332[H]  05-21            Review of Systems	      Objective     Physical Examination    heart s1s2  lung dc bs  head nc  head at  abd soft      Pertinent Lab findings & Imaging      Brandie:  NO   Adequate UO     I&O's Detail    22 May 2025 07:01  -  23 May 2025 04:30  --------------------------------------------------------  IN:  Total IN: 0 mL    OUT:    Voided (mL): 1100 mL  Total OUT: 1100 mL    Total NET: -1100 mL               Discussed with:     Cultures:	        Radiology

## 2025-05-23 NOTE — PROGRESS NOTE ADULT - PROBLEM SELECTOR PLAN 1
Pt presents with B/L tremors and confusion - REG  -Transaminitis: Alk Phos: 332. AST/ALT: 168/90 - trend CMP - improving  -CIWA protocol initiated: Valium 5 mg (PRN CIWA 8-14). Valium 10 mg (PRN CIWA >15). Valium taper  -Folic acid, MVA, thiamine  -Alcohol cessation  -Addiction medicine (Dr. Valdovinos), consulted  -d/w pt and son rod  -pending PT eval

## 2025-05-23 NOTE — DIETITIAN INITIAL EVALUATION ADULT - PROBLEM/PLAN-3
DISPLAY PLAN FREE TEXT Detail Level: Detailed Quality 110: Preventive Care And Screening: Influenza Immunization: Influenza Immunization previously received during influenza season

## 2025-05-23 NOTE — DIETITIAN INITIAL EVALUATION ADULT - PERTINENT MEDS FT
MEDICATIONS  (STANDING):  diazepam  Injectable 5 milliGRAM(s) IV Push every 8 hours  diazepam  Injectable   IV Push   enoxaparin Injectable 40 milliGRAM(s) SubCutaneous every 24 hours  folic acid 1 milliGRAM(s) Oral daily  multivitamin 1 Tablet(s) Oral daily  nicotine -  14 mG/24Hr(s) Patch 1 Patch Transdermal daily  nystatin Powder 1 Application(s) Topical two times a day  potassium chloride    Tablet ER 40 milliEquivalent(s) Oral every 4 hours  thiamine 100 milliGRAM(s) Oral daily    MEDICATIONS  (PRN):  diazepam  Injectable 5 milliGRAM(s) IV Push every 1 hour PRN CIWA 8-14  diazepam  Injectable 10 milliGRAM(s) IV Push every 1 hour PRN CIWA 15 or greater, or seizure

## 2025-05-23 NOTE — CARE COORDINATION ASSESSMENT. - NSCAREPROVIDERS_GEN_ALL_CORE_FT
CARE PROVIDERS:  Accepting Physician: Black Guajardo  Administration: Rolando Artis  Administration: Blake Purcell  Administration: Arelis Tang  Admitting: Black Guajardo  Attending: Neo iFnn  Cardiology Technician: Romi Rucker  Consultant: Alli Valdovinos  Covering Team: Simran Jarrett  ED ACP: Maddi Olmedo  ED Attending: Serg Ceron  ED Nurse: Lorna Fairbanks  Nurse: Toña Agrawal  Override: Jessica Blackwell  Override: Neo Howard  PCA/Nursing Assistant: Delmis Salazar  Primary Team: Black Guajardo  Primary Team: Ever Parker  Primary Team: Neo Finn  Registered Dietitian: Heidi Hale  Respiratory Therapy: Saleem Trivedi  : Sola Fong  Team: [facility], Team  UR// Supp. Assoc.: Cammy Patel

## 2025-05-23 NOTE — DIETITIAN INITIAL EVALUATION ADULT - PROBLEM SELECTOR PLAN 1
Pt presents with B/L tremors and confusion  -Transaminitis: Alk Phos: 332. AST/ALT: 168/90  -s/p 2 mg ativan and 50 mg Librium in the ED  -CIWA protocol initiated: Valium 5 mg (PRN CIWA 8-14). Valium 10 mg (PRN CIWA >15). Valium taper  -Folic acid, MVA, thiamine  -Addiction medicine (Dr. Valdovinos), consulted

## 2025-05-23 NOTE — DIETITIAN INITIAL EVALUATION ADULT - PERTINENT LABORATORY DATA
05-23    141  |  105  |  8   ----------------------------<  85  2.8[LL]   |  31  |  0.43[L]    Ca    8.5      23 May 2025 07:15  Phos  2.2     05-23  Mg     2.0     05-23    TPro  4.7[L]  /  Alb  2.2[L]  /  TBili  0.8  /  DBili  x   /  AST  92[H]  /  ALT  64  /  AlkPhos  279[H]  05-23

## 2025-05-23 NOTE — PROGRESS NOTE ADULT - SUBJECTIVE AND OBJECTIVE BOX
Patient is a 65y old  Female who presents with a chief complaint of Alcohol Withdrawal and Leg Edema (23 May 2025 04:29)      FROM ADMISSION H+P:   HPI:  64 y/o w/ no significant PMHx presents w/ alcohol withdrawal. Pt's  contacted over the phone and provided much of the hx. Pt's  endorses that pt has become weaker over the past several months in conjunction with weakness. Pt's  also states that pt's legs have become swollen over the past month in that it has impedes her ability to walk but she is still able to walk unassisted. Pt's  says last time she had a drink was two days ago of which was a pint of tenisha (she has been drinking every day for 20 yrs). Pt's  states he found her on the floor today, unable to get up with tremors so she was brought to [facility].      IN THE ED:  Temp 97.2F, HR 74, /68, RR 18, SpO2 100% on 4L  S/P 2 mg ativan, 50 mg librium, 1L LRs  Labs significant for H/H: 10.6/29.2, MCV: 110, Na: 133, K: 3.4, T Bili: 1.6, Alk Phos: 332, AST/ALT: 168/90  Imaging:  CXR: No radiographic evidence of active chest disease (22 May 2025 00:16)      INTERVAL HPI/OVERNIGHT EVENTS: Patient was seen and examined. No acute events occurred overnight. No new complaints. States she came in as her partner was concerned about her falls. Wants to go home. Does note that she is weak. Son Fernando Austin [phone] called and pt gave permission to speak to him - would like to hear from  re: aides - pt states she does not want aides. States that she has been to [facility] before.    I&O's Summary    22 May 2025 07:01  -  23 May 2025 07:00  --------------------------------------------------------  IN: 0 mL / OUT: 1400 mL / NET: -1400 mL    23 May 2025 07:01  -  23 May 2025 13:43  --------------------------------------------------------  IN: 0 mL / OUT: 400 mL / NET: -400 mL        PAST MEDICAL & SURGICAL HISTORY:  No pertinent past medical history          LABS:                        8.7    5.49  )-----------( 137      ( 23 May 2025 07:15 )             24.9     05-23    141  |  105  |  8   ----------------------------<  85  2.8[LL]   |  31  |  0.43[L]    Ca    8.5      23 May 2025 07:15  Phos  2.2     05-23  Mg     2.0     05-23    TPro  4.7[L]  /  Alb  2.2[L]  /  TBili  0.8  /  DBili  x   /  AST  92[H]  /  ALT  64  /  AlkPhos  279[H]  05-23    PT/INR - ( 23 May 2025 07:15 )   PT: 11.3 sec;   INR: 0.97 ratio         PTT - ( 21 May 2025 21:47 )  PTT:27.9 sec  Urinalysis Basic - ( 23 May 2025 07:15 )    Color: x / Appearance: x / SG: x / pH: x  Gluc: 85 mg/dL / Ketone: x  / Bili: x / Urobili: x   Blood: x / Protein: x / Nitrite: x   Leuk Esterase: x / RBC: x / WBC x   Sq Epi: x / Non Sq Epi: x / Bacteria: x      CAPILLARY BLOOD GLUCOSE            Urinalysis Basic - ( 23 May 2025 07:15 )    Color: x / Appearance: x / SG: x / pH: x  Gluc: 85 mg/dL / Ketone: x  / Bili: x / Urobili: x   Blood: x / Protein: x / Nitrite: x   Leuk Esterase: x / RBC: x / WBC x   Sq Epi: x / Non Sq Epi: x / Bacteria: x        MEDICATIONS  (STANDING):  diazepam  Injectable 5 milliGRAM(s) IV Push every 8 hours  diazepam  Injectable   IV Push   enoxaparin Injectable 40 milliGRAM(s) SubCutaneous every 24 hours  folic acid 1 milliGRAM(s) Oral daily  multivitamin 1 Tablet(s) Oral daily  nicotine -  14 mG/24Hr(s) Patch 1 Patch Transdermal daily  nystatin Powder 1 Application(s) Topical two times a day  potassium chloride    Tablet ER 40 milliEquivalent(s) Oral every 4 hours  potassium phosphate IVPB 15 milliMole(s) IV Intermittent once  thiamine 100 milliGRAM(s) Oral daily    MEDICATIONS  (PRN):  diazepam  Injectable 5 milliGRAM(s) IV Push every 1 hour PRN CIWA 8-14  diazepam  Injectable 10 milliGRAM(s) IV Push every 1 hour PRN CIWA 15 or greater, or seizure      REVIEW OF SYSTEMS:  CONSTITUTIONAL: No fever or chills; + weakness  HEENT:  No headache, no sore throat  RESPIRATORY: No cough, wheezing, or shortness of breath  CARDIOVASCULAR: No chest pain, palpitations  GASTROINTESTINAL: No abdominal pain, nausea, vomiting, or diarrhea  GENITOURINARY: No dysuria, frequency, or hematuria  NEUROLOGICAL: no focal weakness or dizziness; +improved tremors  MUSCULOSKELETAL: no myalgias  PSYCH: no recent changes in mood    RADIOLOGY & ADDITIONAL TESTS:    Imaging Personally Reviewed:  [x] YES  [ ] NO    Consultant(s) Notes Reviewed:  [x] YES  [ ] NO    PHYSICAL EXAM:  T(C): 36.6 (05-23-25 @ 11:19), Max: 36.6 (05-22-25 @ 21:48)  HR: 81 (05-23-25 @ 11:19) (74 - 84)  BP: 91/60 (05-23-25 @ 11:19) (91/56 - 96/65)  RR: 17 (05-23-25 @ 11:19) (17 - 17)  SpO2: 98% (05-23-25 @ 11:19) (97% - 98%)    GENERAL: NAD  HEENT:  anicteric, moist mucous membranes  CHEST/LUNG:  CTA b/l, no rales, wheezes, or rhonchi  HEART:  RRR, S1, S2  ABDOMEN:  BS+, soft, nontender, nondistended  EXTREMITIES: + bilateral LE edema  NERVOUS SYSTEM: answers questions and follows commands appropriately  PSYCH: normal affect    Care Discussed with Consultants/Other Providers [x] YES  [ ] NO

## 2025-05-23 NOTE — SOCIAL WORK PROGRESS NOTE - NSSWPROGRESSNOTE_GEN_ALL_CORE
Met with daughter Alyssa . She does not want to wait for [facility] to be in place. She requesting a referral to [facility] services.  Referred to

## 2025-05-23 NOTE — DIETITIAN INITIAL EVALUATION ADULT - ADD RECOMMEND
Continue MVI, B1, folic acid as ordered.  Monitor po intake, wt, labs, diet tolerance.  Replace low Phos and K+.

## 2025-05-23 NOTE — PROGRESS NOTE ADULT - PROBLEM SELECTOR PLAN 2
Noted anemia today with no obvious bleed  Will check iron studies, b12, folate  check repeat cbc later today

## 2025-05-24 LAB
ALBUMIN SERPL ELPH-MCNC: 2.5 G/DL — LOW (ref 3.3–5)
ALP SERPL-CCNC: 295 U/L — HIGH (ref 40–120)
ALT FLD-CCNC: 62 U/L — SIGNIFICANT CHANGE UP (ref 12–78)
ANION GAP SERPL CALC-SCNC: 5 MMOL/L — SIGNIFICANT CHANGE UP (ref 5–17)
AST SERPL-CCNC: 75 U/L — HIGH (ref 15–37)
BILIRUB SERPL-MCNC: 0.8 MG/DL — SIGNIFICANT CHANGE UP (ref 0.2–1.2)
BUN SERPL-MCNC: 10 MG/DL — SIGNIFICANT CHANGE UP (ref 7–23)
CALCIUM SERPL-MCNC: 8.6 MG/DL — SIGNIFICANT CHANGE UP (ref 8.5–10.1)
CHLORIDE SERPL-SCNC: 107 MMOL/L — SIGNIFICANT CHANGE UP (ref 96–108)
CO2 SERPL-SCNC: 27 MMOL/L — SIGNIFICANT CHANGE UP (ref 22–31)
CREAT SERPL-MCNC: 0.51 MG/DL — SIGNIFICANT CHANGE UP (ref 0.5–1.3)
EGFR: 103 ML/MIN/1.73M2 — SIGNIFICANT CHANGE UP
EGFR: 103 ML/MIN/1.73M2 — SIGNIFICANT CHANGE UP
FERRITIN SERPL-MCNC: 756 NG/ML — HIGH (ref 13–330)
FOLATE SERPL-MCNC: 13.7 NG/ML — SIGNIFICANT CHANGE UP
GLUCOSE SERPL-MCNC: 89 MG/DL — SIGNIFICANT CHANGE UP (ref 70–99)
HCT VFR BLD CALC: 28.5 % — LOW (ref 34.5–45)
HGB BLD-MCNC: 9.6 G/DL — LOW (ref 11.5–15.5)
IRON SATN MFR SERPL: 28 % — SIGNIFICANT CHANGE UP (ref 14–50)
IRON SATN MFR SERPL: 37 UG/DL — SIGNIFICANT CHANGE UP (ref 30–160)
MAGNESIUM SERPL-MCNC: 2 MG/DL — SIGNIFICANT CHANGE UP (ref 1.6–2.6)
MCHC RBC-ENTMCNC: 33.7 G/DL — SIGNIFICANT CHANGE UP (ref 32–36)
MCHC RBC-ENTMCNC: 39.8 PG — HIGH (ref 27–34)
MCV RBC AUTO: 118.3 FL — HIGH (ref 80–100)
NRBC BLD AUTO-RTO: 0 /100 WBCS — SIGNIFICANT CHANGE UP (ref 0–0)
PHOSPHATE SERPL-MCNC: 3 MG/DL — SIGNIFICANT CHANGE UP (ref 2.5–4.5)
PLATELET # BLD AUTO: 166 K/UL — SIGNIFICANT CHANGE UP (ref 150–400)
POTASSIUM SERPL-MCNC: 3.5 MMOL/L — SIGNIFICANT CHANGE UP (ref 3.5–5.3)
POTASSIUM SERPL-SCNC: 3.5 MMOL/L — SIGNIFICANT CHANGE UP (ref 3.5–5.3)
PROT SERPL-MCNC: 5.6 G/DL — LOW (ref 6–8.3)
RBC # BLD: 2.41 M/UL — LOW (ref 3.8–5.2)
RBC # FLD: 15.8 % — HIGH (ref 10.3–14.5)
SODIUM SERPL-SCNC: 139 MMOL/L — SIGNIFICANT CHANGE UP (ref 135–145)
TIBC SERPL-MCNC: 131 UG/DL — LOW (ref 220–430)
TRANSFERRIN SERPL-MCNC: 95 MG/DL — LOW (ref 200–360)
UIBC SERPL-MCNC: 94 UG/DL — LOW (ref 110–370)
VIT B12 SERPL-MCNC: 994 PG/ML — SIGNIFICANT CHANGE UP (ref 232–1245)
WBC # BLD: 7.04 K/UL — SIGNIFICANT CHANGE UP (ref 3.8–10.5)
WBC # FLD AUTO: 7.04 K/UL — SIGNIFICANT CHANGE UP (ref 3.8–10.5)

## 2025-05-24 PROCEDURE — 99233 SBSQ HOSP IP/OBS HIGH 50: CPT

## 2025-05-24 RX ADMIN — FOLIC ACID 1 MILLIGRAM(S): 1 TABLET ORAL at 12:35

## 2025-05-24 RX ADMIN — DIAZEPAM 2 MILLIGRAM(S): 5 TABLET ORAL at 18:36

## 2025-05-24 RX ADMIN — Medication 100 MILLIGRAM(S): at 12:35

## 2025-05-24 RX ADMIN — ENOXAPARIN SODIUM 40 MILLIGRAM(S): 100 INJECTION SUBCUTANEOUS at 06:10

## 2025-05-24 RX ADMIN — NICOTINE POLACRILEX 1 PATCH: 4 GUM, CHEWING ORAL at 12:05

## 2025-05-24 RX ADMIN — NICOTINE POLACRILEX 1 PATCH: 4 GUM, CHEWING ORAL at 12:38

## 2025-05-24 RX ADMIN — NYSTATIN 1 APPLICATION(S): 100000 CREAM TOPICAL at 05:09

## 2025-05-24 RX ADMIN — DIAZEPAM 5 MILLIGRAM(S): 5 TABLET ORAL at 05:05

## 2025-05-24 RX ADMIN — Medication 1 TABLET(S): at 12:35

## 2025-05-24 RX ADMIN — NYSTATIN 1 APPLICATION(S): 100000 CREAM TOPICAL at 18:37

## 2025-05-24 NOTE — CASE MANAGEMENT PROGRESS NOTE - NSCMPROGRESSNOTE_GEN_ALL_CORE
WE Task F/U. Per PT pt is recommended for [facility], SW following for safe dispo planning. CM to remain available

## 2025-05-24 NOTE — PROGRESS NOTE ADULT - SUBJECTIVE AND OBJECTIVE BOX
Patient is a 65y old  Female who presents with a chief complaint of Alcohol Withdrawal and Leg Edema (24 May 2025 09:58)      FROM ADMISSION H+P:   HPI:  64 y/o w/ no significant PMHx presents w/ alcohol withdrawal. Pt's  contacted over the phone and provided much of the hx. Pt's  endorses that pt has become weaker over the past several months in conjunction with weakness. Pt's  also states that pt's legs have become swollen over the past month in that it has impedes her ability to walk but she is still able to walk unassisted. Pt's  says last time she had a drink was two days ago of which was a pint of tenisha (she has been drinking every day for 20 yrs). Pt's  states he found her on the floor today, unable to get up with tremors so she was brought to [facility].      IN THE ED:  Temp 97.2F, HR 74, /68, RR 18, SpO2 100% on 4L  S/P 2 mg ativan, 50 mg librium, 1L LRs  Labs significant for H/H: 10.6/29.2, MCV: 110, Na: 133, K: 3.4, T Bili: 1.6, Alk Phos: 332, AST/ALT: 168/90  Imaging:  CXR: No radiographic evidence of active chest disease (22 May 2025 00:16)      INTERVAL HPI/OVERNIGHT EVENTS: Patient was seen and examined. No acute events occurred overnight. No new complaints.    I&O's Summary    23 May 2025 07:01  -  24 May 2025 07:00  --------------------------------------------------------  IN: 0 mL / OUT: 400 mL / NET: -400 mL        PAST MEDICAL & SURGICAL HISTORY:  No pertinent past medical history          LABS:                        9.6    7.04  )-----------( 166      ( 24 May 2025 06:13 )             28.5     05-24    139  |  107  |  10  ----------------------------<  89  3.5   |  27  |  0.51    Ca    8.6      24 May 2025 06:13  Phos  3.0     05-24  Mg     2.0     05-24    TPro  5.6[L]  /  Alb  2.5[L]  /  TBili  0.8  /  DBili  x   /  AST  75[H]  /  ALT  62  /  AlkPhos  295[H]  05-24    PT/INR - ( 23 May 2025 07:15 )   PT: 11.3 sec;   INR: 0.97 ratio           Urinalysis Basic - ( 24 May 2025 06:13 )    Color: x / Appearance: x / SG: x / pH: x  Gluc: 89 mg/dL / Ketone: x  / Bili: x / Urobili: x   Blood: x / Protein: x / Nitrite: x   Leuk Esterase: x / RBC: x / WBC x   Sq Epi: x / Non Sq Epi: x / Bacteria: x      CAPILLARY BLOOD GLUCOSE            Urinalysis Basic - ( 24 May 2025 06:13 )    Color: x / Appearance: x / SG: x / pH: x  Gluc: 89 mg/dL / Ketone: x  / Bili: x / Urobili: x   Blood: x / Protein: x / Nitrite: x   Leuk Esterase: x / RBC: x / WBC x   Sq Epi: x / Non Sq Epi: x / Bacteria: x        MEDICATIONS  (STANDING):  diazepam  Injectable 2 milliGRAM(s) IV Push every 12 hours  diazepam  Injectable   IV Push   enoxaparin Injectable 40 milliGRAM(s) SubCutaneous every 24 hours  folic acid 1 milliGRAM(s) Oral daily  multivitamin 1 Tablet(s) Oral daily  nicotine -  14 mG/24Hr(s) Patch 1 Patch Transdermal daily  nystatin Powder 1 Application(s) Topical two times a day  thiamine 100 milliGRAM(s) Oral daily    MEDICATIONS  (PRN):  diazepam  Injectable 5 milliGRAM(s) IV Push every 1 hour PRN CIWA 8-14  diazepam  Injectable 10 milliGRAM(s) IV Push every 1 hour PRN CIWA 15 or greater, or seizure      REVIEW OF SYSTEMS:  CONSTITUTIONAL: No fever or chills  HEENT:  No headache, no sore throat  RESPIRATORY: No cough, wheezing, or shortness of breath  CARDIOVASCULAR: No chest pain, palpitations  GASTROINTESTINAL: No abdominal pain, nausea, vomiting, or diarrhea  GENITOURINARY: No dysuria, frequency, or hematuria  NEUROLOGICAL: no focal weakness or dizziness  MUSCULOSKELETAL: no myalgias  PSYCH: no recent changes in mood    RADIOLOGY & ADDITIONAL TESTS:    Imaging Personally Reviewed:  [x] YES  [ ] NO    Consultant(s) Notes Reviewed:  [x] YES  [ ] NO    PHYSICAL EXAM:  T(C): 36.6 (05-24-25 @ 11:41), Max: 36.8 (05-24-25 @ 00:07)  HR: 72 (05-24-25 @ 11:41) (72 - 89)  BP: 104/67 (05-24-25 @ 11:41) (90/52 - 104/67)  RR: 18 (05-24-25 @ 11:41) (17 - 18)  SpO2: 97% (05-24-25 @ 11:41) (96% - 100%)    GENERAL: NAD, well-developed, well-groomed  HEENT:  anicteric, moist mucous membranes  CHEST/LUNG:  CTA b/l, no rales, wheezes, or rhonchi  HEART:  RRR, S1, S2  ABDOMEN:  BS+, soft, nontender, nondistended  EXTREMITIES: no edema, cyanosis, or calf tenderness  NERVOUS SYSTEM: answers questions and follows commands appropriately  PSYCH: normal affect    Care Discussed with Consultants/Other Providers [x] YES  [ ] NO Patient is a 65y old  Female who presents with a chief complaint of Alcohol Withdrawal and Leg Edema (24 May 2025 09:58)      FROM ADMISSION H+P:   HPI:  64 y/o w/ no significant PMHx presents w/ alcohol withdrawal. Pt's  contacted over the phone and provided much of the hx. Pt's  endorses that pt has become weaker over the past several months in conjunction with weakness. Pt's  also states that pt's legs have become swollen over the past month in that it has impedes her ability to walk but she is still able to walk unassisted. Pt's  says last time she had a drink was two days ago of which was a pint of tenisha (she has been drinking every day for 20 yrs). Pt's  states he found her on the floor today, unable to get up with tremors so she was brought to [facility].      IN THE ED:  Temp 97.2F, HR 74, /68, RR 18, SpO2 100% on 4L  S/P 2 mg ativan, 50 mg librium, 1L LRs  Labs significant for H/H: 10.6/29.2, MCV: 110, Na: 133, K: 3.4, T Bili: 1.6, Alk Phos: 332, AST/ALT: 168/90  Imaging:  CXR: No radiographic evidence of active chest disease (22 May 2025 00:16)      INTERVAL HPI/OVERNIGHT EVENTS: Patient was seen and examined. No acute events occurred overnight. No new complaints. Now agreeable to rehab - states she would want to give [facility] another shot. Spilling food on bed. Feels weak as before.    I&O's Summary    23 May 2025 07:01  -  24 May 2025 07:00  --------------------------------------------------------  IN: 0 mL / OUT: 400 mL / NET: -400 mL        PAST MEDICAL & SURGICAL HISTORY:  No pertinent past medical history          LABS:                        9.6    7.04  )-----------( 166      ( 24 May 2025 06:13 )             28.5     05-24    139  |  107  |  10  ----------------------------<  89  3.5   |  27  |  0.51    Ca    8.6      24 May 2025 06:13  Phos  3.0     05-24  Mg     2.0     05-24    TPro  5.6[L]  /  Alb  2.5[L]  /  TBili  0.8  /  DBili  x   /  AST  75[H]  /  ALT  62  /  AlkPhos  295[H]  05-24    PT/INR - ( 23 May 2025 07:15 )   PT: 11.3 sec;   INR: 0.97 ratio           Urinalysis Basic - ( 24 May 2025 06:13 )    Color: x / Appearance: x / SG: x / pH: x  Gluc: 89 mg/dL / Ketone: x  / Bili: x / Urobili: x   Blood: x / Protein: x / Nitrite: x   Leuk Esterase: x / RBC: x / WBC x   Sq Epi: x / Non Sq Epi: x / Bacteria: x      CAPILLARY BLOOD GLUCOSE            Urinalysis Basic - ( 24 May 2025 06:13 )    Color: x / Appearance: x / SG: x / pH: x  Gluc: 89 mg/dL / Ketone: x  / Bili: x / Urobili: x   Blood: x / Protein: x / Nitrite: x   Leuk Esterase: x / RBC: x / WBC x   Sq Epi: x / Non Sq Epi: x / Bacteria: x        MEDICATIONS  (STANDING):  diazepam  Injectable 2 milliGRAM(s) IV Push every 12 hours  diazepam  Injectable   IV Push   enoxaparin Injectable 40 milliGRAM(s) SubCutaneous every 24 hours  folic acid 1 milliGRAM(s) Oral daily  multivitamin 1 Tablet(s) Oral daily  nicotine -  14 mG/24Hr(s) Patch 1 Patch Transdermal daily  nystatin Powder 1 Application(s) Topical two times a day  thiamine 100 milliGRAM(s) Oral daily    MEDICATIONS  (PRN):  diazepam  Injectable 5 milliGRAM(s) IV Push every 1 hour PRN CIWA 8-14  diazepam  Injectable 10 milliGRAM(s) IV Push every 1 hour PRN CIWA 15 or greater, or seizure      REVIEW OF SYSTEMS:  limited history due to mental status - feels weak    RADIOLOGY & ADDITIONAL TESTS:    Imaging Personally Reviewed:  [x] YES  [ ] NO    Consultant(s) Notes Reviewed:  [x] YES  [ ] NO    PHYSICAL EXAM:  T(C): 36.6 (05-24-25 @ 11:41), Max: 36.8 (05-24-25 @ 00:07)  HR: 72 (05-24-25 @ 11:41) (72 - 89)  BP: 104/67 (05-24-25 @ 11:41) (90/52 - 104/67)  RR: 18 (05-24-25 @ 11:41) (17 - 18)  SpO2: 97% (05-24-25 @ 11:41) (96% - 100%)    GENERAL: NAD, disheveled  HEENT:  anicteric, moist mucous membranes  CHEST/LUNG:  CTA b/l, no rales, wheezes, or rhonchi  HEART:  RRR, S1, S2  ABDOMEN:  BS+, soft, nontender, nondistended  EXTREMITIES: LE edema no tenderness  NERVOUS SYSTEM: answers questions and follows commands appropriately; noted mild tremors, decreased muscle tone  PSYCH: flat affect    Care Discussed with Consultants/Other Providers [x] YES  [ ] NO

## 2025-05-24 NOTE — PROGRESS NOTE ADULT - SUBJECTIVE AND OBJECTIVE BOX
Date/Time Patient Seen:  		  Referring MD:   Data Reviewed	       Patient is a 65y old  Female who presents with a chief complaint of Alcohol Withdrawal and Leg Edema (23 May 2025 15:14)      Subjective/HPI     PAST MEDICAL & SURGICAL HISTORY:  No pertinent past medical history          Medication list         MEDICATIONS  (STANDING):  diazepam  Injectable 2 milliGRAM(s) IV Push every 12 hours  diazepam  Injectable   IV Push   enoxaparin Injectable 40 milliGRAM(s) SubCutaneous every 24 hours  folic acid 1 milliGRAM(s) Oral daily  multivitamin 1 Tablet(s) Oral daily  nicotine -  14 mG/24Hr(s) Patch 1 Patch Transdermal daily  nystatin Powder 1 Application(s) Topical two times a day  thiamine 100 milliGRAM(s) Oral daily    MEDICATIONS  (PRN):  diazepam  Injectable 5 milliGRAM(s) IV Push every 1 hour PRN CIWA 8-14  diazepam  Injectable 10 milliGRAM(s) IV Push every 1 hour PRN CIWA 15 or greater, or seizure         Vitals log        ICU Vital Signs Last 24 Hrs  T(C): 36.7 (24 May 2025 06:22), Max: 36.8 (24 May 2025 00:07)  T(F): 98 (24 May 2025 06:22), Max: 98.2 (24 May 2025 00:07)  HR: 79 (24 May 2025 06:22) (79 - 89)  BP: 92/55 (24 May 2025 06:22) (90/52 - 92/55)  BP(mean): --  ABP: --  ABP(mean): --  RR: 17 (24 May 2025 06:22) (17 - 17)  SpO2: 100% (24 May 2025 06:22) (96% - 100%)    O2 Parameters below as of 24 May 2025 06:22  Patient On (Oxygen Delivery Method): room air                 Input and Output:  I&O's Detail    23 May 2025 07:01  -  24 May 2025 07:00  --------------------------------------------------------  IN:  Total IN: 0 mL    OUT:    Voided (mL): 400 mL  Total OUT: 400 mL    Total NET: -400 mL          Lab Data                        9.6    7.04  )-----------( 166      ( 24 May 2025 06:13 )             28.5     05-24    139  |  107  |  10  ----------------------------<  89  3.5   |  27  |  0.51    Ca    8.6      24 May 2025 06:13  Phos  3.0     05-24  Mg     2.0     05-24    TPro  5.6[L]  /  Alb  2.5[L]  /  TBili  0.8  /  DBili  x   /  AST  75[H]  /  ALT  62  /  AlkPhos  295[H]  05-24            Review of Systems	      Objective     Physical Examination    heart s1s2  lung dc bs  head nc  head at      Pertinent Lab findings & Imaging      Brandie:  NO   Adequate UO     I&O's Detail    23 May 2025 07:01  -  24 May 2025 07:00  --------------------------------------------------------  IN:  Total IN: 0 mL    OUT:    Voided (mL): 400 mL  Total OUT: 400 mL    Total NET: -400 mL               Discussed with:     Cultures:	        Radiology

## 2025-05-24 NOTE — SOCIAL WORK PROGRESS NOTE - NSSWPROGRESSNOTE_GEN_ALL_CORE
Patient refusing subacute rehab though only walking 5 feet. Significant other would like  her to go. Unclear if she able to make medical decision at this time. Will refer to a radius of facilities and discuss mental status with MD

## 2025-05-24 NOTE — PHYSICAL THERAPY INITIAL EVALUATION ADULT - ADDITIONAL COMMENTS
Patient reports that she lives with partner in a private house. States that she ambulates without a cane or RW at baseline "but probably should use one." Requires assist for ADLs.

## 2025-05-24 NOTE — PROGRESS NOTE ADULT - PROBLEM SELECTOR PLAN 1
Pt presents with B/L tremors and confusion - REG  -Transaminitis: Alk Phos: 332. AST/ALT: 168/90 - trend CMP - improving  -CIWA protocol initiated: Valium 5 mg (PRN CIWA 8-14). Valium 10 mg (PRN CIWA >15). Valium taper  -Folic acid, MVA, thiamine  -Alcohol cessation  -Addiction medicine (Dr. Valdovinos), consulted  -d/w pt and son rod  -pending PT eval Pt presents with B/L tremors and confusion - REG  -Transaminitis: Alk Phos: 332. AST/ALT: 168/90 - trend CMP - improving  -CIWA protocol initiated: Valium 5 mg (PRN CIWA 8-14). Valium 10 mg (PRN CIWA >15). Valium taper  -Folic acid, MVA, thiamine  -Alcohol cessation  -Addiction medicine (Dr. Valdovinos), consulted  -PT Eval - [facility]  -d/w pt 5/24 - agreeable for [facility] - [facility] #1 choice - d/w SW

## 2025-05-24 NOTE — PROGRESS NOTE ADULT - PROBLEM SELECTOR PLAN 2
Noted anemia today with no obvious bleed  Will check iron studies, b12, folate  check repeat cbc later today Noted anemia with no obvious bleed  Will check iron studies, b12, folate - reviewed AOCD + Fe def

## 2025-05-25 LAB
ANION GAP SERPL CALC-SCNC: 8 MMOL/L — SIGNIFICANT CHANGE UP (ref 5–17)
BUN SERPL-MCNC: 14 MG/DL — SIGNIFICANT CHANGE UP (ref 7–23)
CALCIUM SERPL-MCNC: 8.3 MG/DL — LOW (ref 8.5–10.1)
CHLORIDE SERPL-SCNC: 110 MMOL/L — HIGH (ref 96–108)
CO2 SERPL-SCNC: 23 MMOL/L — SIGNIFICANT CHANGE UP (ref 22–31)
CREAT SERPL-MCNC: 0.41 MG/DL — LOW (ref 0.5–1.3)
EGFR: 108 ML/MIN/1.73M2 — SIGNIFICANT CHANGE UP
EGFR: 108 ML/MIN/1.73M2 — SIGNIFICANT CHANGE UP
GLUCOSE SERPL-MCNC: 84 MG/DL — SIGNIFICANT CHANGE UP (ref 70–99)
HCT VFR BLD CALC: 23.5 % — LOW (ref 34.5–45)
HGB BLD-MCNC: 8.2 G/DL — LOW (ref 11.5–15.5)
MAGNESIUM SERPL-MCNC: 2 MG/DL — SIGNIFICANT CHANGE UP (ref 1.6–2.6)
MCHC RBC-ENTMCNC: 34.9 G/DL — SIGNIFICANT CHANGE UP (ref 32–36)
MCHC RBC-ENTMCNC: 40.4 PG — HIGH (ref 27–34)
MCV RBC AUTO: 115.8 FL — HIGH (ref 80–100)
NRBC BLD AUTO-RTO: 0 /100 WBCS — SIGNIFICANT CHANGE UP (ref 0–0)
PLATELET # BLD AUTO: 164 K/UL — SIGNIFICANT CHANGE UP (ref 150–400)
POTASSIUM SERPL-MCNC: 3.2 MMOL/L — LOW (ref 3.5–5.3)
POTASSIUM SERPL-SCNC: 3.2 MMOL/L — LOW (ref 3.5–5.3)
RBC # BLD: 2.03 M/UL — LOW (ref 3.8–5.2)
RBC # FLD: 15.8 % — HIGH (ref 10.3–14.5)
SODIUM SERPL-SCNC: 141 MMOL/L — SIGNIFICANT CHANGE UP (ref 135–145)
WBC # BLD: 7 K/UL — SIGNIFICANT CHANGE UP (ref 3.8–10.5)
WBC # FLD AUTO: 7 K/UL — SIGNIFICANT CHANGE UP (ref 3.8–10.5)

## 2025-05-25 PROCEDURE — 99232 SBSQ HOSP IP/OBS MODERATE 35: CPT

## 2025-05-25 RX ADMIN — Medication 40 MILLIEQUIVALENT(S): at 18:19

## 2025-05-25 RX ADMIN — ENOXAPARIN SODIUM 40 MILLIGRAM(S): 100 INJECTION SUBCUTANEOUS at 05:49

## 2025-05-25 RX ADMIN — Medication 1 TABLET(S): at 12:43

## 2025-05-25 RX ADMIN — Medication 100 MILLIGRAM(S): at 12:44

## 2025-05-25 RX ADMIN — Medication 40 MILLIEQUIVALENT(S): at 22:12

## 2025-05-25 RX ADMIN — NYSTATIN 1 APPLICATION(S): 100000 CREAM TOPICAL at 18:20

## 2025-05-25 RX ADMIN — NICOTINE POLACRILEX 1 PATCH: 4 GUM, CHEWING ORAL at 19:49

## 2025-05-25 RX ADMIN — NICOTINE POLACRILEX 1 PATCH: 4 GUM, CHEWING ORAL at 12:44

## 2025-05-25 RX ADMIN — NICOTINE POLACRILEX 1 PATCH: 4 GUM, CHEWING ORAL at 05:19

## 2025-05-25 RX ADMIN — DIAZEPAM 2 MILLIGRAM(S): 5 TABLET ORAL at 05:14

## 2025-05-25 RX ADMIN — FOLIC ACID 1 MILLIGRAM(S): 1 TABLET ORAL at 12:44

## 2025-05-25 NOTE — DISCHARGE NOTE NURSING/CASE MANAGEMENT/SOCIAL WORK - PATIENT PORTAL LINK FT
You can access the FollowMyHealth Patient Portal offered by [facility] by registering at the following website: [email or web address]. By joining [employer]’s FollowMyHealth portal, you will also be able to view your health information using other applications (apps) compatible with our system.

## 2025-05-25 NOTE — DISCHARGE NOTE NURSING/CASE MANAGEMENT/SOCIAL WORK - FINANCIAL ASSISTANCE
[facility] provides services at a reduced cost to those who are determined to be eligible through [facility]’s financial assistance program. Information regarding [facility]’s financial assistance program can be found by going to [email or web address] or by calling [phone].

## 2025-05-25 NOTE — PROGRESS NOTE ADULT - PROBLEM SELECTOR PLAN 1
Pt presents with B/L tremors and confusion - REG  -Transaminitis: Alk Phos: 332. AST/ALT: 168/90 - trend CMP - LFTs improved  -CIWA protocol initiated: Valium 5 mg (PRN CIWA 8-14). Valium 10 mg (PRN CIWA >15). Valium taper  -Folic acid, MVA, thiamine  -Alcohol cessation  -Addiction medicine (Dr. Valdovinos), consulted  -PT Eval - [facility]  -d/w pt and S.O. Ed - plan is for [facility] - states gave choices to SW

## 2025-05-25 NOTE — DISCHARGE NOTE NURSING/CASE MANAGEMENT/SOCIAL WORK - NSDCPEFALRISK_GEN_ALL_CORE
For information on Fall & Injury Prevention, visit: [email or web address] OR  [email or web address] OR  https://www.cdc.gov/steadi/patient.html

## 2025-05-25 NOTE — PROGRESS NOTE ADULT - SUBJECTIVE AND OBJECTIVE BOX
Patient is a 65y old  Female who presents with a chief complaint of Alcohol Withdrawal and Leg Edema (24 May 2025 13:32)      FROM ADMISSION H+P:   HPI:  66 y/o w/ no significant PMHx presents w/ alcohol withdrawal. Pt's  contacted over the phone and provided much of the hx. Pt's  endorses that pt has become weaker over the past several months in conjunction with weakness. Pt's  also states that pt's legs have become swollen over the past month in that it has impedes her ability to walk but she is still able to walk unassisted. Pt's  says last time she had a drink was two days ago of which was a pint of tenisha (she has been drinking every day for 20 yrs). Pt's  states he found her on the floor today, unable to get up with tremors so she was brought to [facility].      IN THE ED:  Temp 97.2F, HR 74, /68, RR 18, SpO2 100% on 4L  S/P 2 mg ativan, 50 mg librium, 1L LRs  Labs significant for H/H: 10.6/29.2, MCV: 110, Na: 133, K: 3.4, T Bili: 1.6, Alk Phos: 332, AST/ALT: 168/90  Imaging:  CXR: No radiographic evidence of active chest disease (22 May 2025 00:16)      INTERVAL HPI/OVERNIGHT EVENTS: Patient was seen and examined. No acute events occurred overnight. Feels well. States that she was concerned about rehab bc she has 2 dogs and they need to be walked separately. Significant other Edward at bedside today - states that she never went to [facility] re[facility] before - went to one off [facility] the [facility] - not the [facility].    I&O's Summary    24 May 2025 07:01  -  25 May 2025 07:00  --------------------------------------------------------  IN: 0 mL / OUT: 1000 mL / NET: -1000 mL        PAST MEDICAL & SURGICAL HISTORY:  No pertinent past medical history          LABS:                        8.2    7.00  )-----------( 164      ( 25 May 2025 07:30 )             23.5     05-25    141  |  110[H]  |  14  ----------------------------<  84  3.2[L]   |  23  |  0.41[L]    Ca    8.3[L]      25 May 2025 07:30  Phos  3.0     05-24  Mg     2.0     05-25    TPro  5.6[L]  /  Alb  2.5[L]  /  TBili  0.8  /  DBili  x   /  AST  75[H]  /  ALT  62  /  AlkPhos  295[H]  05-24      Urinalysis Basic - ( 25 May 2025 07:30 )    Color: x / Appearance: x / SG: x / pH: x  Gluc: 84 mg/dL / Ketone: x  / Bili: x / Urobili: x   Blood: x / Protein: x / Nitrite: x   Leuk Esterase: x / RBC: x / WBC x   Sq Epi: x / Non Sq Epi: x / Bacteria: x      CAPILLARY BLOOD GLUCOSE            Urinalysis Basic - ( 25 May 2025 07:30 )    Color: x / Appearance: x / SG: x / pH: x  Gluc: 84 mg/dL / Ketone: x  / Bili: x / Urobili: x   Blood: x / Protein: x / Nitrite: x   Leuk Esterase: x / RBC: x / WBC x   Sq Epi: x / Non Sq Epi: x / Bacteria: x        MEDICATIONS  (STANDING):  enoxaparin Injectable 40 milliGRAM(s) SubCutaneous every 24 hours  folic acid 1 milliGRAM(s) Oral daily  multivitamin 1 Tablet(s) Oral daily  nicotine -  14 mG/24Hr(s) Patch 1 Patch Transdermal daily  nystatin Powder 1 Application(s) Topical two times a day  potassium chloride    Tablet ER 40 milliEquivalent(s) Oral every 4 hours  thiamine 100 milliGRAM(s) Oral daily    MEDICATIONS  (PRN):  diazepam  Injectable 5 milliGRAM(s) IV Push every 1 hour PRN CIWA 8-14  diazepam  Injectable 10 milliGRAM(s) IV Push every 1 hour PRN CIWA 15 or greater, or seizure      REVIEW OF SYSTEMS:  denies complaints - unreliable historian    RADIOLOGY & ADDITIONAL TESTS:    Imaging Personally Reviewed:  [x] YES  [ ] NO    Consultant(s) Notes Reviewed:  [x] YES  [ ] NO    PHYSICAL EXAM:  T(C): 36.3 (05-25-25 @ 13:19), Max: 37.2 (05-24-25 @ 21:22)  HR: 70 (05-25-25 @ 13:19) (70 - 99)  BP: 92/59 (05-25-25 @ 13:19) (92/54 - 96/60)  RR: 18 (05-25-25 @ 13:19) (18 - 18)  SpO2: 99% (05-25-25 @ 13:19) (95% - 99%)    GENERAL: NAD  HEENT:  anicteric, moist mucous membranes  CHEST/LUNG:  CTA b/l, no rales, wheezes, or rhonchi  HEART:  RRR, S1, S2  ABDOMEN:  BS+, soft, nontender, nondistended  EXTREMITIES: +edema  NERVOUS SYSTEM: answers questions and follows commands appropriately  PSYCH: normal affect    Care Discussed with Consultants/Other Providers [x] YES  [ ] NO

## 2025-05-25 NOTE — PROGRESS NOTE ADULT - PROBLEM SELECTOR PLAN 2
Noted anemia with no obvious bleed  Iron studies, b12, folate reviewed  Monitor hgb - bp on lower end  Suspect admission were hemoconcentrated  can hold [facility] for now

## 2025-05-26 LAB
ANION GAP SERPL CALC-SCNC: 7 MMOL/L — SIGNIFICANT CHANGE UP (ref 5–17)
BUN SERPL-MCNC: 11 MG/DL — SIGNIFICANT CHANGE UP (ref 7–23)
CALCIUM SERPL-MCNC: 8.7 MG/DL — SIGNIFICANT CHANGE UP (ref 8.5–10.1)
CHLORIDE SERPL-SCNC: 112 MMOL/L — HIGH (ref 96–108)
CO2 SERPL-SCNC: 22 MMOL/L — SIGNIFICANT CHANGE UP (ref 22–31)
CREAT SERPL-MCNC: 0.44 MG/DL — LOW (ref 0.5–1.3)
EGFR: 107 ML/MIN/1.73M2 — SIGNIFICANT CHANGE UP
EGFR: 107 ML/MIN/1.73M2 — SIGNIFICANT CHANGE UP
GLUCOSE SERPL-MCNC: 104 MG/DL — HIGH (ref 70–99)
HCT VFR BLD CALC: 26.6 % — LOW (ref 34.5–45)
HGB BLD-MCNC: 8.9 G/DL — LOW (ref 11.5–15.5)
MAGNESIUM SERPL-MCNC: 1.9 MG/DL — SIGNIFICANT CHANGE UP (ref 1.6–2.6)
MCHC RBC-ENTMCNC: 33.5 G/DL — SIGNIFICANT CHANGE UP (ref 32–36)
MCHC RBC-ENTMCNC: 40.3 PG — HIGH (ref 27–34)
MCV RBC AUTO: 120.4 FL — HIGH (ref 80–100)
NRBC BLD AUTO-RTO: 0 /100 WBCS — SIGNIFICANT CHANGE UP (ref 0–0)
PHOSPHATE SERPL-MCNC: 3.3 MG/DL — SIGNIFICANT CHANGE UP (ref 2.5–4.5)
PLATELET # BLD AUTO: 215 K/UL — SIGNIFICANT CHANGE UP (ref 150–400)
POTASSIUM SERPL-MCNC: 3.9 MMOL/L — SIGNIFICANT CHANGE UP (ref 3.5–5.3)
POTASSIUM SERPL-SCNC: 3.9 MMOL/L — SIGNIFICANT CHANGE UP (ref 3.5–5.3)
RBC # BLD: 2.21 M/UL — LOW (ref 3.8–5.2)
RBC # FLD: 15.4 % — HIGH (ref 10.3–14.5)
SODIUM SERPL-SCNC: 141 MMOL/L — SIGNIFICANT CHANGE UP (ref 135–145)
WBC # BLD: 6.39 K/UL — SIGNIFICANT CHANGE UP (ref 3.8–10.5)
WBC # FLD AUTO: 6.39 K/UL — SIGNIFICANT CHANGE UP (ref 3.8–10.5)

## 2025-05-26 PROCEDURE — 99232 SBSQ HOSP IP/OBS MODERATE 35: CPT

## 2025-05-26 RX ADMIN — NICOTINE POLACRILEX 1 PATCH: 4 GUM, CHEWING ORAL at 05:53

## 2025-05-26 RX ADMIN — NICOTINE POLACRILEX 1 PATCH: 4 GUM, CHEWING ORAL at 12:00

## 2025-05-26 RX ADMIN — NYSTATIN 1 APPLICATION(S): 100000 CREAM TOPICAL at 17:33

## 2025-05-26 RX ADMIN — Medication 40 MILLIEQUIVALENT(S): at 13:54

## 2025-05-26 RX ADMIN — NYSTATIN 1 APPLICATION(S): 100000 CREAM TOPICAL at 05:03

## 2025-05-26 RX ADMIN — Medication 1 TABLET(S): at 12:55

## 2025-05-26 RX ADMIN — FOLIC ACID 1 MILLIGRAM(S): 1 TABLET ORAL at 12:55

## 2025-05-26 RX ADMIN — NICOTINE POLACRILEX 1 PATCH: 4 GUM, CHEWING ORAL at 12:55

## 2025-05-26 RX ADMIN — Medication 100 MILLIGRAM(S): at 12:55

## 2025-05-26 RX ADMIN — NICOTINE POLACRILEX 1 PATCH: 4 GUM, CHEWING ORAL at 13:57

## 2025-05-26 NOTE — SOCIAL WORK PROGRESS NOTE - NSSWPROGRESSNOTE_GEN_ALL_CORE
[facility] not accepting- spoke with pt and spouse at bedside- they also chose [facility]. Referral sent. SW to follow.

## 2025-05-26 NOTE — PROGRESS NOTE ADULT - SUBJECTIVE AND OBJECTIVE BOX
Patient is a 65y old  Female who presents with a chief complaint of Alcohol Withdrawal and Leg Edema (26 May 2025 15:32)      FROM ADMISSION H+P:   HPI:  66 y/o w/ no significant PMHx presents w/ alcohol withdrawal. Pt's  contacted over the phone and provided much of the hx. Pt's  endorses that pt has become weaker over the past several months in conjunction with weakness. Pt's  also states that pt's legs have become swollen over the past month in that it has impedes her ability to walk but she is still able to walk unassisted. Pt's  says last time she had a drink was two days ago of which was a pint of tenisha (she has been drinking every day for 20 yrs). Pt's  states he found her on the floor today, unable to get up with tremors so she was brought to [facility].      IN THE ED:  Temp 97.2F, HR 74, /68, RR 18, SpO2 100% on 4L  S/P 2 mg ativan, 50 mg librium, 1L LRs  Labs significant for H/H: 10.6/29.2, MCV: 110, Na: 133, K: 3.4, T Bili: 1.6, Alk Phos: 332, AST/ALT: 168/90  Imaging:  CXR: No radiographic evidence of active chest disease (22 May 2025 00:16)      INTERVAL HPI/OVERNIGHT EVENTS: Patient was seen and examined. No acute events occurred overnight. No new complaints. S.O. at bedside. They have provided more rehab options. Patient is hoping for a decent rehab - does not remember the one she does not like.    I&O's Summary      PAST MEDICAL & SURGICAL HISTORY:  No pertinent past medical history          LABS:                        8.9    6.39  )-----------( 215      ( 26 May 2025 08:15 )             26.6     05-26    141  |  112[H]  |  11  ----------------------------<  104[H]  3.9   |  22  |  0.44[L]    Ca    8.7      26 May 2025 08:15  Phos  3.3     05-26  Mg     1.9     05-26        Urinalysis Basic - ( 26 May 2025 08:15 )    Color: x / Appearance: x / SG: x / pH: x  Gluc: 104 mg/dL / Ketone: x  / Bili: x / Urobili: x   Blood: x / Protein: x / Nitrite: x   Leuk Esterase: x / RBC: x / WBC x   Sq Epi: x / Non Sq Epi: x / Bacteria: x      CAPILLARY BLOOD GLUCOSE            Urinalysis Basic - ( 26 May 2025 08:15 )    Color: x / Appearance: x / SG: x / pH: x  Gluc: 104 mg/dL / Ketone: x  / Bili: x / Urobili: x   Blood: x / Protein: x / Nitrite: x   Leuk Esterase: x / RBC: x / WBC x   Sq Epi: x / Non Sq Epi: x / Bacteria: x        MEDICATIONS  (STANDING):  folic acid 1 milliGRAM(s) Oral daily  multivitamin 1 Tablet(s) Oral daily  nicotine -  14 mG/24Hr(s) Patch 1 Patch Transdermal daily  nystatin Powder 1 Application(s) Topical two times a day  thiamine 100 milliGRAM(s) Oral daily    MEDICATIONS  (PRN):  diazepam  Injectable 5 milliGRAM(s) IV Push every 1 hour PRN CIWA 8-14  diazepam  Injectable 10 milliGRAM(s) IV Push every 1 hour PRN CIWA 15 or greater, or seizure      REVIEW OF SYSTEMS:  unable to obtain due to mental status  denies all complaints    RADIOLOGY & ADDITIONAL TESTS:    Imaging Personally Reviewed:  [x] YES  [ ] NO    Consultant(s) Notes Reviewed:  [x] YES  [ ] NO    PHYSICAL EXAM:  T(C): 36.8 (05-26-25 @ 13:05), Max: 36.8 (05-26-25 @ 04:54)  HR: 81 (05-26-25 @ 13:05) (73 - 83)  BP: 97/62 (05-26-25 @ 13:05) (91/57 - 97/62)  RR: 18 (05-26-25 @ 13:05) (18 - 18)  SpO2: 100% (05-26-25 @ 13:05) (95% - 100%)    GENERAL: NAD, appears older than stated age  HEENT:  anicteric, moist mucous membranes  CHEST/LUNG:  CTA b/l, no rales, wheezes, or rhonchi  HEART:  RRR, S1, S2  ABDOMEN:  BS+, soft, nontender, nondistended  EXTREMITIES: + edema  NERVOUS SYSTEM: answers questions and follows commands appropriately  PSYCH: normal affect    Care Discussed with Consultants/Other Providers [x] YES  [ ] NO

## 2025-05-26 NOTE — PROGRESS NOTE ADULT - SUBJECTIVE AND OBJECTIVE BOX
Date/Time Patient Seen:  		  Referring MD:   Data Reviewed	       Patient is a 65y old  Female who presents with a chief complaint of Alcohol Withdrawal and Leg Edema (25 May 2025 16:42)      Subjective/HPI     PAST MEDICAL & SURGICAL HISTORY:  No pertinent past medical history          Medication list         MEDICATIONS  (STANDING):  folic acid 1 milliGRAM(s) Oral daily  multivitamin 1 Tablet(s) Oral daily  nicotine -  14 mG/24Hr(s) Patch 1 Patch Transdermal daily  nystatin Powder 1 Application(s) Topical two times a day  thiamine 100 milliGRAM(s) Oral daily    MEDICATIONS  (PRN):  diazepam  Injectable 5 milliGRAM(s) IV Push every 1 hour PRN CIWA 8-14  diazepam  Injectable 10 milliGRAM(s) IV Push every 1 hour PRN CIWA 15 or greater, or seizure         Vitals log        ICU Vital Signs Last 24 Hrs  T(C): 36.8 (26 May 2025 13:05), Max: 36.8 (26 May 2025 04:54)  T(F): 98.3 (26 May 2025 13:05), Max: 98.3 (26 May 2025 04:54)  HR: 81 (26 May 2025 13:05) (73 - 83)  BP: 97/62 (26 May 2025 13:05) (91/57 - 97/62)  BP(mean): --  ABP: --  ABP(mean): --  RR: 18 (26 May 2025 13:05) (18 - 18)  SpO2: 100% (26 May 2025 13:05) (95% - 100%)    O2 Parameters below as of 26 May 2025 13:05  Patient On (Oxygen Delivery Method): room air                 Input and Output:  I&O's Detail      Lab Data                        8.9    6.39  )-----------( 215      ( 26 May 2025 08:15 )             26.6     05-26    141  |  112[H]  |  11  ----------------------------<  104[H]  3.9   |  22  |  0.44[L]    Ca    8.7      26 May 2025 08:15  Phos  3.3     05-26  Mg     1.9     05-26              Review of Systems	      Objective     Physical Examination    heart s1s2  lung dc bs  head nc  head at  abd soft      Pertinent Lab findings & Imaging      Brandie:  NO   Adequate UO     I&O's Detail           Discussed with:     Cultures:	        Radiology

## 2025-05-26 NOTE — PROGRESS NOTE ADULT - PROBLEM SELECTOR PLAN 1
Pt presents with B/L tremors and confusion - REG  -Transaminitis: Alk Phos: 332. AST/ALT: 168/90 - trend CMP - LFTs improved  -CIWA protocol initiated: Valium 5 mg (PRN CIWA 8-14). Valium 10 mg (PRN CIWA >15). Valium taper  -Folic acid, MVA, thiamine  -Alcohol cessation  -Addiction medicine (Dr. Valdovinos), consulted  -PT Eval - [facility]  -d/w pt and S.O. Ed - plan is for [facility] - states gave choices to SW  -Lab holiday - pt is medically stable for transition to [facility]

## 2025-05-27 PROCEDURE — 99232 SBSQ HOSP IP/OBS MODERATE 35: CPT

## 2025-05-27 RX ADMIN — NICOTINE POLACRILEX 1 PATCH: 4 GUM, CHEWING ORAL at 11:00

## 2025-05-27 RX ADMIN — NYSTATIN 1 APPLICATION(S): 100000 CREAM TOPICAL at 17:11

## 2025-05-27 RX ADMIN — Medication 1 TABLET(S): at 11:00

## 2025-05-27 RX ADMIN — NYSTATIN 1 APPLICATION(S): 100000 CREAM TOPICAL at 05:46

## 2025-05-27 RX ADMIN — FOLIC ACID 1 MILLIGRAM(S): 1 TABLET ORAL at 11:00

## 2025-05-27 RX ADMIN — Medication 100 MILLIGRAM(S): at 11:00

## 2025-05-27 RX ADMIN — NICOTINE POLACRILEX 1 PATCH: 4 GUM, CHEWING ORAL at 11:44

## 2025-05-27 RX ADMIN — NICOTINE POLACRILEX 1 PATCH: 4 GUM, CHEWING ORAL at 07:48

## 2025-05-27 NOTE — PROGRESS NOTE ADULT - NSPROGADDITIONALINFOA_GEN_ALL_CORE
Time-based billing (NON-critical care).     40 minutes spent on total encounter. The necessity of the time spent during the encounter on this date of service was due to:     activities including direct patient care, counseling and/or coordinating care, reviewing notes/lab data/imaging, and discussion with multidisciplinary team.

## 2025-05-27 NOTE — PROGRESS NOTE ADULT - SUBJECTIVE AND OBJECTIVE BOX
Date/Time Patient Seen:  		  Referring MD:   Data Reviewed	       Patient is a 65y old  Female who presents with a chief complaint of Alcohol Withdrawal and Leg Edema (26 May 2025 15:34)      Subjective/HPI     PAST MEDICAL & SURGICAL HISTORY:  No pertinent past medical history          Medication list         MEDICATIONS  (STANDING):  folic acid 1 milliGRAM(s) Oral daily  multivitamin 1 Tablet(s) Oral daily  nicotine -  14 mG/24Hr(s) Patch 1 Patch Transdermal daily  nystatin Powder 1 Application(s) Topical two times a day  thiamine 100 milliGRAM(s) Oral daily    MEDICATIONS  (PRN):  diazepam  Injectable 5 milliGRAM(s) IV Push every 1 hour PRN CIWA 8-14  diazepam  Injectable 10 milliGRAM(s) IV Push every 1 hour PRN CIWA 15 or greater, or seizure         Vitals log        ICU Vital Signs Last 24 Hrs  T(C): 37 (27 May 2025 04:56), Max: 37.6 (26 May 2025 20:21)  T(F): 98.6 (27 May 2025 04:56), Max: 99.6 (26 May 2025 20:21)  HR: 75 (27 May 2025 04:56) (75 - 93)  BP: 99/62 (27 May 2025 04:56) (96/56 - 99/62)  BP(mean): --  ABP: --  ABP(mean): --  RR: 18 (27 May 2025 04:56) (18 - 18)  SpO2: 93% (27 May 2025 04:56) (93% - 100%)    O2 Parameters below as of 27 May 2025 04:56  Patient On (Oxygen Delivery Method): room air                 Input and Output:  I&O's Detail    25 May 2025 07:01  -  26 May 2025 07:00  --------------------------------------------------------  IN:  Total IN: 0 mL    OUT:    Stool (mL): 1 mL  Total OUT: 1 mL    Total NET: -1 mL      26 May 2025 07:01  -  27 May 2025 05:22  --------------------------------------------------------  IN:  Total IN: 0 mL    OUT:    Stool (mL): 1 mL    Voided (mL): 900 mL  Total OUT: 901 mL    Total NET: -901 mL          Lab Data                        8.9    6.39  )-----------( 215      ( 26 May 2025 08:15 )             26.6     05-26    141  |  112[H]  |  11  ----------------------------<  104[H]  3.9   |  22  |  0.44[L]    Ca    8.7      26 May 2025 08:15  Phos  3.3     05-26  Mg     1.9     05-26              Review of Systems	      Objective     Physical Examination    heart s1s2  lung dc bs  head nc  head at      Pertinent Lab findings & Imaging      Brandie:  NO   Adequate UO     I&O's Detail    25 May 2025 07:01  -  26 May 2025 07:00  --------------------------------------------------------  IN:  Total IN: 0 mL    OUT:    Stool (mL): 1 mL  Total OUT: 1 mL    Total NET: -1 mL      26 May 2025 07:01  -  27 May 2025 05:22  --------------------------------------------------------  IN:  Total IN: 0 mL    OUT:    Stool (mL): 1 mL    Voided (mL): 900 mL  Total OUT: 901 mL    Total NET: -901 mL               Discussed with:     Cultures:	        Radiology

## 2025-05-27 NOTE — CONSULT NOTE ADULT - SUBJECTIVE AND OBJECTIVE BOX
Date/Time Patient Seen:  		  Referring MD:   Data Reviewed	       Patient is a 65y old  Female who presents with a chief complaint of Alcohol Withdrawal and Leg Edema (22 May 2025 00:16)      Subjective/HPI   History and Physical:   Source of Information	Chart(s), Patient  Outpatient Providers	Dr. Nancy Iraheta (PCP)       Patient Identity:  · Birth Sex	Female     History of Present Illness:  Reason for Admission: Alcohol Withdrawal and Leg Edema  History of Present Illness:   64 y/o w/ no significant PMHx presents w/ alcohol withdrawal. Pt's  contacted over the phone and provided much of the hx. Pt's  endorses that pt has become weaker over the past several months in conjunction with weakness. Pt's  also states that pt's legs have become swollen over the past month in that it has impedes her ability to walk but she is still able to walk unassisted. Pt's  says last time she had a drink was two days ago of which was a pint of tenisha (she has been drinking every day for 20 yrs). Pt's  states he found her on the floor today, unable to get up with tremors so she was brought to [facility].      IN THE ED:  Temp 97.2F, HR 74, /68, RR 18, SpO2 100% on 4L  S/P 2 mg ativan, 50 mg librium, 1L LRs  Labs significant for H/H: 10.6/29.2, MCV: 110, Na: 133, K: 3.4, T Bili: 1.6, Alk Phos: 332, AST/ALT: 168/90  Imaging:  CXR: No radiographic evidence of active chest disease       Review of Systems:  Review of Systems: REVIEW OF SYSTEMS:  CONSTITUTIONAL: No fever/chills  HENMT: (+) HA  RESPIRATORY: No shortness of breath.  CARDIOVASCULAR: No chest pain, palpitations.  GASTROINTESTINAL: No abdominal or epigastric pain. No nausea or vomiting; No diarrhea or constipation.  NEUROLOGICAL: Tremors (B/L)      Allergies and Intolerances:        Allergies:  	No Known Allergies:     Home Medications:   * No Current Medications as of 22-May-2025 00:16 documented in Structured Notes    Patient History:    Past Medical, Past Surgical, and Family History:  PAST MEDICAL HISTORY:  No pertinent past medical history.     Social History:  · Substance use	No  · Social History (marital status, living situation, occupation, and sexual history)	Tobacco: 1 ppd per day  Alcohol: 1 pint of tenisha a day  Illicit Drug Use: Denies  Ambulation: Independent  ADLs: Independent     Tobacco Screening:  · Core Measure Site	Yes  · Has the patient used tobacco in the past 30 days?	Yes  · Tobacco Cessation Education/Counseling	Offered and provided  · Tobacco Cessation Medication	Offered and patient accepted    Risk Assessment:    Present on Admission:  Deep Venous Thrombosis	no  Pulmonary Embolus	no  Urinary Catheter	no  Central Venous Catheter/PICC Line	no  Surgical Site Incision	no  Pressure Ulcer(s)	no     HIV Screening:  · In accordance with [place] State law, we offer every patient who comes to our ED an HIV test. Would you like to be tested today?	Opt out     Hepatitis C Test Questions:  · In accordance with [place] Law, we offer every patient a Hepatitis C test. Would you like to be tested today?	Opt out    PAST MEDICAL & SURGICAL HISTORY:  No pertinent past medical history          Medication list         MEDICATIONS  (STANDING):  diazepam  Injectable 5 milliGRAM(s) IV Push every 6 hours  diazepam  Injectable   IV Push   enoxaparin Injectable 40 milliGRAM(s) SubCutaneous every 24 hours  folic acid 1 milliGRAM(s) Oral daily  multivitamin 1 Tablet(s) Oral daily  nicotine -  14 mG/24Hr(s) Patch 1 Patch Transdermal daily  nystatin Powder 1 Application(s) Topical two times a day  thiamine 100 milliGRAM(s) Oral daily    MEDICATIONS  (PRN):  diazepam  Injectable 5 milliGRAM(s) IV Push every 1 hour PRN CIWA 8-14  diazepam  Injectable 10 milliGRAM(s) IV Push every 1 hour PRN CIWA 15 or greater, or seizure         Vitals log        ICU Vital Signs Last 24 Hrs  T(C): 37.1 (22 May 2025 05:22), Max: 37.1 (22 May 2025 05:22)  T(F): 98.7 (22 May 2025 05:22), Max: 98.7 (22 May 2025 05:22)  HR: 67 (22 May 2025 05:22) (67 - 77)  BP: 98/68 (22 May 2025 05:22) (91/77 - 102/68)  BP(mean): --  ABP: --  ABP(mean): --  RR: 17 (22 May 2025 05:22) (17 - 18)  SpO2: 94% (22 May 2025 05:22) (94% - 100%)    O2 Parameters below as of 22 May 2025 05:22  Patient On (Oxygen Delivery Method): room air                 Input and Output:  I&O's Detail      Lab Data                        10.6   6.69  )-----------( 142      ( 21 May 2025 20:20 )             29.2     05-21    133[L]  |  94[L]  |  7   ----------------------------<  85  3.4[L]   |  30  |  0.51    Ca    9.2      21 May 2025 21:47  Mg     1.8     05-21    TPro  5.4[L]  /  Alb  2.5[L]  /  TBili  1.6[H]  /  DBili  x   /  AST  168[H]  /  ALT  90[H]  /  AlkPhos  332[H]  05-21            Review of Systems	  weak  poor historian      Objective     Physical Examination    heart s1s2  lung dc bs  head nc      Pertinent Lab findings & Imaging      Diego:  NO   Adequate UO     I&O's Detail           Discussed with:     Cultures:	        Radiology    ACC: [id number] EXAM:  XR CHEST PORTABLE URGENT 1V   ORDERED BY: JULIANE VIVAS     PROCEDURE DATE:  05/21/2025          INTERPRETATION:  Portable AP chest radiograph    COMPARISON:  NONE.    CLINICAL INFORMATION: Weakness.    FINDINGS:  CATHETERS AND TUBES: None    PULMONARY:  No airspace consolidations or radiographic evidence of pulmonary nodules..  No pleural effusion or pneumothorax.    HEART/VASCULAR: The heart size and mediastinum configuration are within   the limits of normal. 12 thoracic aorta.    BONES: The visualized osseous thorax is intact.    IMPRESSION:   No radiographic evidence of active chest disease..    --- End of Report ---            STEVE APARICIO MD; Attending Radiologist  This document has been electronically signed. May 21 2025  8:03PM                          
Chief Complaint;           T(C): 36.9 (05-27-25 @ 11:32), Max: 37.6 (05-26-25 @ 20:21)  HR: 81 (05-27-25 @ 11:32) (75 - 93)  BP: 91/59 (05-27-25 @ 11:32) (91/59 - 99/62)  RR: 18 (05-27-25 @ 11:32) (18 - 18)  SpO2: 96% (05-27-25 @ 11:32) (93% - 96%)    No Known Allergies        Physical Exam: Patient refused to be seen.     Skin:

## 2025-05-27 NOTE — PROGRESS NOTE ADULT - PROBLEM SELECTOR PLAN 1
Pt presents with B/L tremors and confusion - REG  -Transaminitis: Alk Phos: 332. AST/ALT: 168/90 - trend CMP - LFTs improved  -CIWA protocol initiated: Valium 5 mg (PRN CIWA 8-14). Valium 10 mg (PRN CIWA >15). Valium taper  -Folic acid, MVA, thiamine  -Alcohol cessation  -Addiction medicine (Dr. Valdovinos), consulted  -PT Eval - [facility]  -d/w pt and S.O. Ed - plan is for [facility] - states gave choices to SW  -Lab holiday - pt is medically stable for transition to [facility]

## 2025-05-27 NOTE — SOCIAL WORK PROGRESS NOTE - NSSWPROGRESSNOTE_GEN_ALL_CORE
No accepting subacute rehab  facilities . Called s/o and left message. Sent to additional facilities in radius of patients home

## 2025-05-28 PROCEDURE — 99232 SBSQ HOSP IP/OBS MODERATE 35: CPT

## 2025-05-28 RX ADMIN — NICOTINE POLACRILEX 1 PATCH: 4 GUM, CHEWING ORAL at 10:52

## 2025-05-28 RX ADMIN — NYSTATIN 1 APPLICATION(S): 100000 CREAM TOPICAL at 05:35

## 2025-05-28 RX ADMIN — NICOTINE POLACRILEX 1 PATCH: 4 GUM, CHEWING ORAL at 11:06

## 2025-05-28 RX ADMIN — NICOTINE POLACRILEX 1 PATCH: 4 GUM, CHEWING ORAL at 16:48

## 2025-05-28 RX ADMIN — Medication 100 MILLIGRAM(S): at 11:06

## 2025-05-28 RX ADMIN — NICOTINE POLACRILEX 1 PATCH: 4 GUM, CHEWING ORAL at 07:00

## 2025-05-28 RX ADMIN — Medication 1 TABLET(S): at 11:05

## 2025-05-28 RX ADMIN — NYSTATIN 1 APPLICATION(S): 100000 CREAM TOPICAL at 17:05

## 2025-05-28 RX ADMIN — FOLIC ACID 1 MILLIGRAM(S): 1 TABLET ORAL at 11:05

## 2025-05-28 NOTE — SOCIAL WORK PROGRESS NOTE - NSSWPROGRESSNOTE_GEN_ALL_CORE
Accepts bed offer from [facility] subacute rehab . They will open the case for insurance authorization. Anticipate dc in 24 to 72 hours when auth received

## 2025-05-28 NOTE — PROGRESS NOTE ADULT - SUBJECTIVE AND OBJECTIVE BOX
Patient is a 65y old  Female who presents with a chief complaint of Alcohol Withdrawal and Leg Edema (28 May 2025 05:27)       INTERVAL HPI/OVERNIGHT EVENTS: Patient seen and examined at bedside. No new events/complaints noted. Wants to walk. No chest pain, palpitations, sob, n/v/d/c     MEDICATIONS  (STANDING):  folic acid 1 milliGRAM(s) Oral daily  multivitamin 1 Tablet(s) Oral daily  nicotine -  14 mG/24Hr(s) Patch 1 Patch Transdermal daily  nystatin Powder 1 Application(s) Topical two times a day  thiamine 100 milliGRAM(s) Oral daily    MEDICATIONS  (PRN):  diazepam  Injectable 5 milliGRAM(s) IV Push every 1 hour PRN CIWA 8-14  diazepam  Injectable 10 milliGRAM(s) IV Push every 1 hour PRN CIWA 15 or greater, or seizure      Allergies    No Known Allergies    Intolerances        REVIEW OF SYSTEMS:  Per HPI. All other ROS Noted Negative   Vital Signs Last 24 Hrs  T(C): 36.8 (28 May 2025 05:29), Max: 36.9 (27 May 2025 11:32)  T(F): 98.3 (28 May 2025 05:29), Max: 98.5 (27 May 2025 11:32)  HR: 70 (28 May 2025 05:29) (70 - 85)  BP: 101/62 (28 May 2025 05:29) (91/59 - 101/62)  BP(mean): --  RR: 20 (28 May 2025 05:29) (18 - 20)  SpO2: 94% (28 May 2025 05:29) (92% - 96%)    Parameters below as of 28 May 2025 05:29  Patient On (Oxygen Delivery Method): room air        PHYSICAL EXAM:  GENERAL: NAD, comfortable   HEENT:  anicteric, moist mucous membranes  CHEST/LUNG:  CTA b/l, no rales, wheezes, or rhonchi  HEART:  RRR, S1, S2  ABDOMEN:  BS+, soft, nontender, nondistended  EXTREMITIES: + edema  NERVOUS SYSTEM: answers questions and follows commands appropriately  PSYCH: normal affect  SKIN: No rashes    LABS:            CAPILLARY BLOOD GLUCOSE        BLOOD CULTURE    RADIOLOGY & ADDITIONAL TESTS:    Imaging Personally Reviewed:  [ ] YES     Consultant(s) Notes Reviewed:      Care Discussed with Consultants/Other Providers:

## 2025-05-28 NOTE — PROGRESS NOTE ADULT - PROBLEM SELECTOR PLAN 1
Pt presents with B/L tremors and confusion - REG  -Transaminitis: Alk Phos: 332. AST/ALT: 168/90 - trend CMP - LFTs improved  -CIWA protocol initiated: Valium 5 mg (PRN CIWA 8-14). Valium 10 mg (PRN CIWA >15). Valium taper  -Folic acid, MVA, thiamine  -Alcohol cessation  -Addiction medicine (Dr. Valdovinos), consulted  -PT Eval - [facility]  -d/w pt and S.O. Ed - plan is for [facility] - states gave choices to SW  -pt is medically stable for transition to [facility]

## 2025-05-28 NOTE — PROGRESS NOTE ADULT - SUBJECTIVE AND OBJECTIVE BOX
Date/Time Patient Seen:  		  Referring MD:   Data Reviewed	       Patient is a 65y old  Female who presents with a chief complaint of Alcohol Withdrawal and Leg Edema (27 May 2025 15:50)      Subjective/HPI     PAST MEDICAL & SURGICAL HISTORY:  No pertinent past medical history          Medication list         MEDICATIONS  (STANDING):  folic acid 1 milliGRAM(s) Oral daily  multivitamin 1 Tablet(s) Oral daily  nicotine -  14 mG/24Hr(s) Patch 1 Patch Transdermal daily  nystatin Powder 1 Application(s) Topical two times a day  thiamine 100 milliGRAM(s) Oral daily    MEDICATIONS  (PRN):  diazepam  Injectable 5 milliGRAM(s) IV Push every 1 hour PRN CIWA 8-14  diazepam  Injectable 10 milliGRAM(s) IV Push every 1 hour PRN CIWA 15 or greater, or seizure         Vitals log        ICU Vital Signs Last 24 Hrs  T(C): 36.7 (27 May 2025 21:08), Max: 36.9 (27 May 2025 11:32)  T(F): 98 (27 May 2025 21:08), Max: 98.5 (27 May 2025 11:32)  HR: 85 (27 May 2025 21:08) (81 - 85)  BP: 97/59 (27 May 2025 21:08) (91/59 - 97/59)  BP(mean): --  ABP: --  ABP(mean): --  RR: 18 (27 May 2025 21:08) (18 - 18)  SpO2: 92% (27 May 2025 21:08) (92% - 96%)    O2 Parameters below as of 27 May 2025 21:08  Patient On (Oxygen Delivery Method): room air                 Input and Output:  I&O's Detail    26 May 2025 07:01  -  27 May 2025 07:00  --------------------------------------------------------  IN:  Total IN: 0 mL    OUT:    Stool (mL): 1 mL    Voided (mL): 900 mL  Total OUT: 901 mL    Total NET: -901 mL      27 May 2025 07:01  -  28 May 2025 05:28  --------------------------------------------------------  IN:  Total IN: 0 mL    OUT:    Voided (mL): 300 mL  Total OUT: 300 mL    Total NET: -300 mL          Lab Data                        8.9    6.39  )-----------( 215      ( 26 May 2025 08:15 )             26.6     05-26    141  |  112[H]  |  11  ----------------------------<  104[H]  3.9   |  22  |  0.44[L]    Ca    8.7      26 May 2025 08:15  Phos  3.3     05-26  Mg     1.9     05-26              Review of Systems	      Objective     Physical Examination    heart s1s2  lung dc bs  head nc  head at  abd soft      Pertinent Lab findings & Imaging      Brandie:  NO   Adequate UO     I&O's Detail    26 May 2025 07:01  -  27 May 2025 07:00  --------------------------------------------------------  IN:  Total IN: 0 mL    OUT:    Stool (mL): 1 mL    Voided (mL): 900 mL  Total OUT: 901 mL    Total NET: -901 mL      27 May 2025 07:01  -  28 May 2025 05:28  --------------------------------------------------------  IN:  Total IN: 0 mL    OUT:    Voided (mL): 300 mL  Total OUT: 300 mL    Total NET: -300 mL               Discussed with:     Cultures:	        Radiology

## 2025-05-29 PROCEDURE — 99232 SBSQ HOSP IP/OBS MODERATE 35: CPT

## 2025-05-29 RX ADMIN — FOLIC ACID 1 MILLIGRAM(S): 1 TABLET ORAL at 11:06

## 2025-05-29 RX ADMIN — Medication 100 MILLIGRAM(S): at 11:06

## 2025-05-29 RX ADMIN — NICOTINE POLACRILEX 1 PATCH: 4 GUM, CHEWING ORAL at 19:29

## 2025-05-29 RX ADMIN — NYSTATIN 1 APPLICATION(S): 100000 CREAM TOPICAL at 17:04

## 2025-05-29 RX ADMIN — Medication 1 TABLET(S): at 11:06

## 2025-05-29 RX ADMIN — NICOTINE POLACRILEX 1 PATCH: 4 GUM, CHEWING ORAL at 11:06

## 2025-05-29 RX ADMIN — NYSTATIN 1 APPLICATION(S): 100000 CREAM TOPICAL at 05:25

## 2025-05-29 RX ADMIN — NICOTINE POLACRILEX 1 PATCH: 4 GUM, CHEWING ORAL at 13:31

## 2025-05-29 RX ADMIN — NICOTINE POLACRILEX 1 PATCH: 4 GUM, CHEWING ORAL at 07:25

## 2025-05-29 NOTE — PROGRESS NOTE ADULT - SUBJECTIVE AND OBJECTIVE BOX
Date/Time Patient Seen:  		  Referring MD:   Data Reviewed	       Patient is a 65y old  Female who presents with a chief complaint of Alcohol Withdrawal and Leg Edema (28 May 2025 09:03)      Subjective/HPI     PAST MEDICAL & SURGICAL HISTORY:  No pertinent past medical history          Medication list         MEDICATIONS  (STANDING):  folic acid 1 milliGRAM(s) Oral daily  multivitamin 1 Tablet(s) Oral daily  nicotine -  14 mG/24Hr(s) Patch 1 Patch Transdermal daily  nystatin Powder 1 Application(s) Topical two times a day  thiamine 100 milliGRAM(s) Oral daily    MEDICATIONS  (PRN):  diazepam  Injectable 5 milliGRAM(s) IV Push every 1 hour PRN CIWA 8-14  diazepam  Injectable 10 milliGRAM(s) IV Push every 1 hour PRN CIWA 15 or greater, or seizure         Vitals log        ICU Vital Signs Last 24 Hrs  T(C): 37.2 (28 May 2025 20:34), Max: 37.2 (28 May 2025 20:34)  T(F): 98.9 (28 May 2025 20:34), Max: 98.9 (28 May 2025 20:34)  HR: 73 (28 May 2025 20:34) (68 - 73)  BP: 93/60 (28 May 2025 20:34) (93/60 - 95/60)  BP(mean): --  ABP: --  ABP(mean): --  RR: 18 (28 May 2025 20:34) (17 - 18)  SpO2: 97% (28 May 2025 20:34) (94% - 97%)    O2 Parameters below as of 28 May 2025 20:34  Patient On (Oxygen Delivery Method): room air                 Input and Output:  I&O's Detail    27 May 2025 07:01  -  28 May 2025 07:00  --------------------------------------------------------  IN:  Total IN: 0 mL    OUT:    Stool (mL): 0 mL    Voided (mL): 800 mL  Total OUT: 800 mL    Total NET: -800 mL          Lab Data                  Review of Systems	      Objective     Physical Examination    heart s1s2  lung dc bs  head nc  head at      Pertinent Lab findings & Imaging      Diego:  NO   Adequate UO     I&O's Detail    27 May 2025 07:01  -  28 May 2025 07:00  --------------------------------------------------------  IN:  Total IN: 0 mL    OUT:    Stool (mL): 0 mL    Voided (mL): 800 mL  Total OUT: 800 mL    Total NET: -800 mL               Discussed with:     Cultures:	        Radiology

## 2025-05-29 NOTE — PROGRESS NOTE ADULT - PROBLEM SELECTOR PLAN 2
Noted anemia with no obvious bleed  Iron studies, b12, folate reviewed  Monitor hgb - bp on lower end  Suspect admission were hemoconcentrated  Monitor

## 2025-05-29 NOTE — PROGRESS NOTE ADULT - SUBJECTIVE AND OBJECTIVE BOX
Patient is a 65y old  Female who presents with a chief complaint of Alcohol Withdrawal and Leg Edema (29 May 2025 05:31)       INTERVAL HPI/OVERNIGHT EVENTS: Patient seen and examined at bedside. no new events complaints noted     MEDICATIONS  (STANDING):  folic acid 1 milliGRAM(s) Oral daily  multivitamin 1 Tablet(s) Oral daily  nicotine -  14 mG/24Hr(s) Patch 1 Patch Transdermal daily  nystatin Powder 1 Application(s) Topical two times a day  thiamine 100 milliGRAM(s) Oral daily    MEDICATIONS  (PRN):  diazepam  Injectable 5 milliGRAM(s) IV Push every 1 hour PRN CIWA 8-14  diazepam  Injectable 10 milliGRAM(s) IV Push every 1 hour PRN CIWA 15 or greater, or seizure      Allergies    No Known Allergies    Intolerances        REVIEW OF SYSTEMS:  CONSTITUTIONAL: No fever  or fatigue  EYES: No eye pain, visual disturbances, or discharge  ENMT:  No difficulty hearing, tinnitus, vertigo; No sinus or throat pain  NECK: No pain or stiffness  BREASTS: No pain, masses, or nipple discharge  RESPIRATORY: No cough, wheezing, chills or hemoptysis; No shortness of breath  CARDIOVASCULAR: No chest pain, palpitations, dizziness, or leg swelling  GASTROINTESTINAL: No abdominal or epigastric pain. No nausea, vomiting, or hematemesis; No diarrhea or constipation. No melena or hematochezia.    Vital Signs Last 24 Hrs  T(C): 36.4 (29 May 2025 05:20), Max: 37.2 (28 May 2025 20:34)  T(F): 97.6 (29 May 2025 05:20), Max: 98.9 (28 May 2025 20:34)  HR: 70 (29 May 2025 05:20) (68 - 73)  BP: 102/55 (29 May 2025 05:20) (93/60 - 102/55)  BP(mean): --  RR: 18 (29 May 2025 05:20) (17 - 18)  SpO2: 96% (29 May 2025 05:20) (94% - 97%)    Parameters below as of 29 May 2025 05:20  Patient On (Oxygen Delivery Method): room air        PHYSICAL EXAM:  GENERAL: NAD, comfortable   HEENT:  anicteric, moist mucous membranes  CHEST/LUNG:  CTA b/l, no rales, wheezes, or rhonchi  HEART:  RRR, S1, S2  ABDOMEN:  BS+, soft, nontender, nondistended  EXTREMITIES: + edema  NERVOUS SYSTEM: answers questions and follows commands appropriately  PSYCH: normal affect  SKIN: No rashes  LABS:            CAPILLARY BLOOD GLUCOSE        BLOOD CULTURE    RADIOLOGY & ADDITIONAL TESTS:    Imaging Personally Reviewed:  [ ] YES     Consultant(s) Notes Reviewed:      Care Discussed with Consultants/Other Providers:

## 2025-05-29 NOTE — PROGRESS NOTE ADULT - PROBLEM SELECTOR PLAN 1
Pt presents with B/L tremors and confusion - REG  -Transaminitis: Alk Phos: 332. AST/ALT: 168/90 - trend CMP - LFTs improved  -CIWA protocol initiated: Valium 5 mg (PRN CIWA 8-14). Valium 10 mg (PRN CIWA >15). Valium taper  -Folic acid, MVA, thiamine  -Alcohol cessation  -Addiction medicine (Dr. Valdovinos), consulted  -PT Eval - [facility]  -d/w pt and S.O. Ed - plan is for [facility] - states gave choices to SW  -pt is medically stable for transition to [facility]. d/w IDR>

## 2025-05-30 ENCOUNTER — TRANSCRIPTION ENCOUNTER (OUTPATIENT)
Age: 65
End: 2025-05-30

## 2025-05-30 VITALS
DIASTOLIC BLOOD PRESSURE: 61 MMHG | OXYGEN SATURATION: 99 % | RESPIRATION RATE: 18 BRPM | SYSTOLIC BLOOD PRESSURE: 95 MMHG | HEART RATE: 67 BPM | TEMPERATURE: 98 F

## 2025-05-30 PROCEDURE — 83690 ASSAY OF LIPASE: CPT

## 2025-05-30 PROCEDURE — 83540 ASSAY OF IRON: CPT

## 2025-05-30 PROCEDURE — 93970 EXTREMITY STUDY: CPT

## 2025-05-30 PROCEDURE — 83735 ASSAY OF MAGNESIUM: CPT

## 2025-05-30 PROCEDURE — 36415 COLL VENOUS BLD VENIPUNCTURE: CPT

## 2025-05-30 PROCEDURE — 85610 PROTHROMBIN TIME: CPT

## 2025-05-30 PROCEDURE — 83880 ASSAY OF NATRIURETIC PEPTIDE: CPT

## 2025-05-30 PROCEDURE — 86850 RBC ANTIBODY SCREEN: CPT

## 2025-05-30 PROCEDURE — 86900 BLOOD TYPING SEROLOGIC ABO: CPT

## 2025-05-30 PROCEDURE — 84295 ASSAY OF SERUM SODIUM: CPT

## 2025-05-30 PROCEDURE — 86901 BLOOD TYPING SEROLOGIC RH(D): CPT

## 2025-05-30 PROCEDURE — 83550 IRON BINDING TEST: CPT

## 2025-05-30 PROCEDURE — 80048 BASIC METABOLIC PNL TOTAL CA: CPT

## 2025-05-30 PROCEDURE — 99285 EMERGENCY DEPT VISIT HI MDM: CPT

## 2025-05-30 PROCEDURE — 84100 ASSAY OF PHOSPHORUS: CPT

## 2025-05-30 PROCEDURE — 81001 URINALYSIS AUTO W/SCOPE: CPT

## 2025-05-30 PROCEDURE — 93005 ELECTROCARDIOGRAM TRACING: CPT

## 2025-05-30 PROCEDURE — 80053 COMPREHEN METABOLIC PANEL: CPT

## 2025-05-30 PROCEDURE — 82247 BILIRUBIN TOTAL: CPT

## 2025-05-30 PROCEDURE — 97116 GAIT TRAINING THERAPY: CPT

## 2025-05-30 PROCEDURE — 82607 VITAMIN B-12: CPT

## 2025-05-30 PROCEDURE — 82728 ASSAY OF FERRITIN: CPT

## 2025-05-30 PROCEDURE — 97162 PT EVAL MOD COMPLEX 30 MIN: CPT

## 2025-05-30 PROCEDURE — 85027 COMPLETE CBC AUTOMATED: CPT

## 2025-05-30 PROCEDURE — 85730 THROMBOPLASTIN TIME PARTIAL: CPT

## 2025-05-30 PROCEDURE — 71045 X-RAY EXAM CHEST 1 VIEW: CPT

## 2025-05-30 PROCEDURE — 99232 SBSQ HOSP IP/OBS MODERATE 35: CPT

## 2025-05-30 PROCEDURE — 85025 COMPLETE CBC W/AUTO DIFF WBC: CPT

## 2025-05-30 PROCEDURE — 84466 ASSAY OF TRANSFERRIN: CPT

## 2025-05-30 PROCEDURE — 82746 ASSAY OF FOLIC ACID SERUM: CPT

## 2025-05-30 PROCEDURE — 82565 ASSAY OF CREATININE: CPT

## 2025-05-30 PROCEDURE — 80307 DRUG TEST PRSMV CHEM ANLYZR: CPT

## 2025-05-30 RX ORDER — B1/B2/B3/B5/B6/B12/VIT C/FOLIC 500-0.5 MG
1 TABLET ORAL
Qty: 0 | Refills: 0 | DISCHARGE
Start: 2025-05-30

## 2025-05-30 RX ORDER — FOLIC ACID 1 MG/1
1 TABLET ORAL
Qty: 0 | Refills: 0 | DISCHARGE
Start: 2025-05-30

## 2025-05-30 RX ORDER — NICOTINE POLACRILEX 4 MG/1
1 GUM, CHEWING ORAL
Qty: 0 | Refills: 0 | DISCHARGE
Start: 2025-05-30

## 2025-05-30 RX ADMIN — NYSTATIN 1 APPLICATION(S): 100000 CREAM TOPICAL at 05:22

## 2025-05-30 RX ADMIN — Medication 100 MILLIGRAM(S): at 11:29

## 2025-05-30 RX ADMIN — NYSTATIN 1 APPLICATION(S): 100000 CREAM TOPICAL at 17:01

## 2025-05-30 RX ADMIN — Medication 1 TABLET(S): at 11:29

## 2025-05-30 RX ADMIN — FOLIC ACID 1 MILLIGRAM(S): 1 TABLET ORAL at 11:29

## 2025-05-30 RX ADMIN — NICOTINE POLACRILEX 1 PATCH: 4 GUM, CHEWING ORAL at 11:36

## 2025-05-30 RX ADMIN — NICOTINE POLACRILEX 1 PATCH: 4 GUM, CHEWING ORAL at 11:29

## 2025-05-30 NOTE — PROGRESS NOTE ADULT - PROBLEM SELECTOR PLAN 4
Pt presents with leg edema for the past month impeding walking ability  BNP: 614  CXR: No evidence of fluid overload  -Pt had ECHO with [facility] recently - LE edema is more chronic per pt - has been present for months now  -US Duplex B/L negative  -PT consult, f/u recs - waiting eval due to severe hypoK today
Pt presents with leg edema for the past month impeding walking ability  BNP: 614  CXR: No evidence of fluid overload  -Pt had ECHO with [facility] recently - LE edema is more chronic per pt - has been present for months now  -US Duplex B/L negative  -elevate LE - compression stockings
Pt presents with leg edema for the past month impeding walking ability  BNP: 614  CXR: No evidence of fluid overload  -Pt had ECHO with [facility] recently - LE edema is more chronic per pt - has been present for months now  -US Duplex B/L negative  -PT consult, f/u recs - waiting eval due to severe hypoK today
Pt presents with leg edema for the past month impeding walking ability  BNP: 614  CXR: No evidence of fluid overload  -Pt had ECHO with [facility] recently - LE edema is more chronic per pt - has been present for months now  -US Duplex B/L negative  -elevate LE - compression stockings
Pt presents with leg edema for the past month impeding walking ability  BNP: 614  CXR: No evidence of fluid overload  -Pt had ECHO with [facility] recently - LE edema is more chronic per pt - has been present for months now  -US Duplex B/L negative
Pt presents with leg edema for the past month impeding walking ability  BNP: 614  CXR: No evidence of fluid overload  -Pt had ECHO with [facility] recently - LE edema is more chronic per pt - has been present for months now  -US Duplex B/L negative  -elevate LE - compression stockings  -Outpatient f/u with PCP

## 2025-05-30 NOTE — DISCHARGE NOTE PROVIDER - CARE PROVIDER_API CALL
Nancy Iraheta  [address] [address]  Phone: [phone]  Fax: [phone]  Follow Up Time:

## 2025-05-30 NOTE — PROGRESS NOTE ADULT - PROVIDER SPECIALTY LIST ADULT
Addiction Medicine
Hospitalist
Addiction Medicine
Hospitalist

## 2025-05-30 NOTE — DISCHARGE NOTE PROVIDER - NSDCMRMEDTOKEN_GEN_ALL_CORE_FT
folic acid 1 mg oral tablet: 1 tab(s) orally once a day  Multiple Vitamins oral tablet: 1 tab(s) orally once a day  nicotine 14 mg/24 hr transdermal film, extended release: 1 patch transdermal once a day  thiamine 100 mg oral tablet: 1 tab(s) orally once a day

## 2025-05-30 NOTE — PROGRESS NOTE ADULT - TIME BILLING
activities including direct patient care, counseling and/or coordinating care, reviewing notes/lab data/imaging, and discussion with multidisciplinary team
Note written by attending. Meds, labs, vitals, chart reviewed. d/w IDR
Note written by attending. Meds, labs, vitals, chart reviewed
activities including direct patient care, counseling and/or coordinating care, reviewing notes/lab data/imaging, and discussion with multidisciplinary team
activities including direct patient care, counseling and/or coordinating care, reviewing notes/lab data/imaging, and discussion with multidisciplinary team
Note written by attending. Meds, labs, vitals, chart reviewed. d/w IDR
activities including direct patient care, counseling and/or coordinating care, reviewing notes/lab data/imaging, and discussion with multidisciplinary team
activities including direct patient care, counseling and/or coordinating care, reviewing notes/lab data/imaging, documentation on the electronic medical record and discussion with multidisciplinary team

## 2025-05-30 NOTE — PROGRESS NOTE ADULT - PROBLEM SELECTOR PROBLEM 3
Hypokalemia
Hypokalemia
Need for prophylactic measure
Hypokalemia

## 2025-05-30 NOTE — DISCHARGE NOTE PROVIDER - NSDCCPCAREPLAN_GEN_ALL_CORE_FT
PRINCIPAL DISCHARGE DIAGNOSIS  Diagnosis: Alcohol dependence with withdrawal  Assessment and Plan of Treatment: Please continue Vitamins and supplements  Alcohol cessation is highly recommended   f/u with PCP      SECONDARY DISCHARGE DIAGNOSES  Diagnosis: Liver failure  Assessment and Plan of Treatment:

## 2025-05-30 NOTE — PROGRESS NOTE ADULT - SUBJECTIVE AND OBJECTIVE BOX
Date/Time Patient Seen:  		  Referring MD:   Data Reviewed	       Patient is a 65y old  Female who presents with a chief complaint of Alcohol Withdrawal and Leg Edema (29 May 2025 10:24)      Subjective/HPI     PAST MEDICAL & SURGICAL HISTORY:  No pertinent past medical history          Medication list         MEDICATIONS  (STANDING):  folic acid 1 milliGRAM(s) Oral daily  multivitamin 1 Tablet(s) Oral daily  nicotine -  14 mG/24Hr(s) Patch 1 Patch Transdermal daily  nystatin Powder 1 Application(s) Topical two times a day  thiamine 100 milliGRAM(s) Oral daily    MEDICATIONS  (PRN):         Vitals log        ICU Vital Signs Last 24 Hrs  T(C): 36.9 (30 May 2025 05:15), Max: 37.9 (29 May 2025 20:29)  T(F): 98.5 (30 May 2025 05:15), Max: 100.2 (29 May 2025 20:29)  HR: 79 (30 May 2025 05:15) (72 - 79)  BP: 101/60 (30 May 2025 05:15) (92/59 - 101/60)  BP(mean): --  ABP: --  ABP(mean): --  RR: 18 (30 May 2025 05:15) (18 - 18)  SpO2: 98% (30 May 2025 05:15) (98% - 99%)    O2 Parameters below as of 30 May 2025 05:15  Patient On (Oxygen Delivery Method): room air                 Input and Output:  I&O's Detail    28 May 2025 07:01  -  29 May 2025 07:00  --------------------------------------------------------  IN:  Total IN: 0 mL    OUT:    Voided (mL): 800 mL  Total OUT: 800 mL    Total NET: -800 mL      29 May 2025 07:01  -  30 May 2025 05:51  --------------------------------------------------------  IN:    Oral Fluid: 240 mL  Total IN: 240 mL    OUT:    Stool (mL): 1 mL    Voided (mL): 1000 mL  Total OUT: 1001 mL    Total NET: -761 mL          Lab Data                  Review of Systems	      Objective     Physical Examination    heart s1s2  lung dc bs  head c  head at  abd soft      Pertinent Lab findings & Imaging      Brandie:  NO   Adequate UO     I&O's Detail    28 May 2025 07:01  -  29 May 2025 07:00  --------------------------------------------------------  IN:  Total IN: 0 mL    OUT:    Voided (mL): 800 mL  Total OUT: 800 mL    Total NET: -800 mL      29 May 2025 07:01  -  30 May 2025 05:51  --------------------------------------------------------  IN:    Oral Fluid: 240 mL  Total IN: 240 mL    OUT:    Stool (mL): 1 mL    Voided (mL): 1000 mL  Total OUT: 1001 mL    Total NET: -761 mL               Discussed with:     Cultures:	        Radiology

## 2025-05-30 NOTE — CHART NOTE - NSCHARTNOTEFT_GEN_A_CORE
Assessment: patient seen for follow up. chart reviewed. hospital course noted   65y old  Female who presents with a chief complaint of Alcohol Withdrawal and Leg Edema   patient seen this AM sleeping soundly in bed.   good PO intake per flow sheets  5/30 BM       Factors impacting intake: [x ] none [ ] nausea  [ ] vomiting [ ] diarrhea [ ] constipation  [ ]chewing problems [ ] swallowing issues  [ ] other:     Diet Prescription: Diet, Regular (05-22-25 @ 00:16)    Intake: up to 100% per flow sheet    Current Weight: 5/30 158.7#   left and right foot + 1 edema    Pertinent Medications: MEDICATIONS  (STANDING):  folic acid 1 milliGRAM(s) Oral daily  multivitamin 1 Tablet(s) Oral daily  nicotine -  14 mG/24Hr(s) Patch 1 Patch Transdermal daily  nystatin Powder 1 Application(s) Topical two times a day  thiamine 100 milliGRAM(s) Oral daily      Pertinent Labs: B12 and folate WNL, with MCV elevated hx ETOH use noted  Skin: sacrum stage 1 right left buttock stage 1 right and left heel stage 1     Estimated Needs:   [x ] no change since previous assessment based on # 59.8kg 25-30kcals/kg 1495-1794kcals and 1-1.2 gms protein/kg 59-71 gms protein  [ ] recalculated:     Previous Nutrition Diagnosis:   [X) excessive ETOH intake     Nutrition Diagnosis is [ x] ongoing  [ ] resolved [ ] not applicable     New Nutrition Diagnosis: [ x] not applicable       Interventions:   Recommend  [ ] Change Diet To:  [ ] Nutrition Supplement  [ ] Nutrition Support  [x ] Other: continue diet as ordered, follow K and Phos levels (now WNL)     Monitoring and Evaluation:   [x ] PO intake [ x ] Tolerance to diet prescription [ x ] weights [ x ] labs[ x ] follow up per protocol  [ ] other:

## 2025-05-30 NOTE — PROGRESS NOTE ADULT - PROBLEM SELECTOR PLAN 5
VTE ppx: Lovenox 40 mg subq q24h
VTE ppx: SCDs
VTE ppx: SCDs
VTE ppx: Lovenox 40 mg subq q24h
VTE ppx: SCDs

## 2025-05-30 NOTE — PROGRESS NOTE ADULT - SUBJECTIVE AND OBJECTIVE BOX
Patient is a 65y old  Female who presents with a chief complaint of Alcohol Withdrawal and Leg Edema (30 May 2025 05:50)       INTERVAL HPI/OVERNIGHT EVENTS: Patient seen and examined at bedside. No new symptoms/complaints noted     MEDICATIONS  (STANDING):  folic acid 1 milliGRAM(s) Oral daily  multivitamin 1 Tablet(s) Oral daily  nicotine -  14 mG/24Hr(s) Patch 1 Patch Transdermal daily  nystatin Powder 1 Application(s) Topical two times a day  thiamine 100 milliGRAM(s) Oral daily    MEDICATIONS  (PRN):      Allergies    No Known Allergies    Intolerances        REVIEW OF SYSTEMS:  Per HPI, All other ROS Noted Negative   Vital Signs Last 24 Hrs  T(C): 36.9 (30 May 2025 05:15), Max: 37.9 (29 May 2025 20:29)  T(F): 98.5 (30 May 2025 05:15), Max: 100.2 (29 May 2025 20:29)  HR: 79 (30 May 2025 05:15) (72 - 79)  BP: 101/60 (30 May 2025 05:15) (92/59 - 101/60)  BP(mean): --  RR: 18 (30 May 2025 05:15) (18 - 18)  SpO2: 98% (30 May 2025 05:15) (98% - 99%)    Parameters below as of 30 May 2025 05:15  Patient On (Oxygen Delivery Method): room air        PHYSICAL EXAM:  GENERAL: NAD, comfortable   HEENT:  anicteric, moist mucous membranes  CHEST/LUNG:  CTA b/l, no rales, wheezes, or rhonchi  HEART:  RRR, S1, S2  ABDOMEN:  BS+, soft, nontender, nondistended  EXTREMITIES: + edema  NERVOUS SYSTEM: answers questions and follows commands appropriately  PSYCH: normal affect  SKIN: No rashes    LABS:            CAPILLARY BLOOD GLUCOSE        BLOOD CULTURE    RADIOLOGY & ADDITIONAL TESTS:    Imaging Personally Reviewed:  [ ] YES     Consultant(s) Notes Reviewed:      Care Discussed with Consultants/Other Providers:

## 2025-05-30 NOTE — SOCIAL WORK PROGRESS NOTE - NSSWPROGRESSNOTE_GEN_ALL_CORE
Patient to be discharged to [facility] subacute rehab. All in agreement. Copy of chart to follow. Dianeteddy requested and received auth

## 2025-05-30 NOTE — DISCHARGE NOTE PROVIDER - CARE PROVIDERS DIRECT ADDRESSES
[email or web address]

## 2025-05-30 NOTE — DISCHARGE NOTE PROVIDER - HOSPITAL COURSE
65y female with no significant PMHx presents w/ alcohol withdrawal and leg edema.    - Patient  presented  with B/L tremors and confusion - REG  -Transaminitis: Alk Phos: 332. AST/ALT: 168/90 - trend CMP - LFTs improved  -CIWA protocol initiated: Valium 5 mg (PRN CIWA 8-14). Valium 10 mg (PRN CIWA >15). Valium taper  -Folic acid, MVA, thiamine  -Alcohol cessation education and counseling   -PT Eval - [facility]  -Pt presents with leg edema for the past month impeding walking ability  BNP: 614  CXR: No evidence of fluid overload  -Pt had ECHO with [facility] recently - LE edema is more chronic per pt - has been present for months now  -US Duplex B/L negative  -elevate LE - compression stockings  -Outpatient f/u with PCP.    Patient is medically stable for dc to [facility]. Seen and examined. VSS.   GENERAL: NAD, comfortable   HEENT:  anicteric, moist mucous membranes  CHEST/LUNG:  CTA b/l, no rales, wheezes, or rhonchi  HEART:  RRR, S1, S2  ABDOMEN:  BS+, soft, nontender, nondistended  NERVOUS SYSTEM: answers questions and follows commands appropriately  PSYCH: normal affect  SKIN: No rashes      Total discharge time spent 50 minutes including medication reconciliation, counseling and education

## 2025-05-30 NOTE — PROGRESS NOTE ADULT - ASSESSMENT
66 y/o w/ no significant PMHx presents w/ alcohol withdrawal. Pt's  contacted over the phone and provided much of the hx. Pt's  endorses that pt has become weaker over the past several months in conjunction with weakness. Pt's  also states that pt's legs have become swollen over the past month in that it has impedes her ability to walk but she is still able to walk unassisted    ashley  weakness  depression  poor historian  anemia  hepatitis    VS noted  meds reviewed  nrt  dc planning    ciwa  folic acid  thiamine  SBIRT documentation  assist with needs  monitor mentation  oral hygiene  skin care  trend LFT  nutritional status assessment  CXR on admission [facility]  replete ira  emotional support      
66 y/o w/ no significant PMHx presents w/ alcohol withdrawal. Pt's  contacted over the phone and provided much of the hx. Pt's  endorses that pt has become weaker over the past several months in conjunction with weakness. Pt's  also states that pt's legs have become swollen over the past month in that it has impedes her ability to walk but she is still able to walk unassisted    ashley  weakness  depression  poor historian  anemia  hepatitis    doppler studies neg for DVT  detox in progress  sw f/u and SBIRT documentation pending    [facility]  valium detox  folic acid  thiamine  SBIRT documentation  assist with needs  monitor mentation  oral hygiene  skin care  trend LFT  nutritional status assessment  CXR on admission [facility]  replboris roth  emotional support
	  64 y/o w/ no significant PMHx presents w/ alcohol withdrawal. Pt's  contacted over the phone and provided much of the hx. Pt's  endorses that pt has become weaker over the past several months in conjunction with weakness. Pt's  also states that pt's legs have become swollen over the past month in that it has impedes her ability to walk but she is still able to walk unassisted    ashley  weakness  depression  poor historian  anemia  hepatitis    doppler studies neg for DVT    ciwa  valium detox  folic acid  thiamine  SBIRT documentation  assist with needs  monitor mentation  oral hygiene  skin care  trend LFT  nutritional status assessment  CXR on admission [facility]  replete ira  emotional support
66 y/o w/ no significant PMHx presents w/ alcohol withdrawal and leg edema. 
64 y/o w/ no significant PMHx presents w/ alcohol withdrawal. Pt's  contacted over the phone and provided much of the hx. Pt's  endorses that pt has become weaker over the past several months in conjunction with weakness. Pt's  also states that pt's legs have become swollen over the past month in that it has impedes her ability to walk but she is still able to walk unassisted    ashley  weakness  depression  poor historian  anemia  hepatitis    VS noted  meds reviewed  prn valium    ciwa  folic acid  thiamine  SBIRT documentation  assist with needs  monitor mentation  oral hygiene  skin care  trend LFT  nutritional status assessment  CXR on admission [facility]  replete ira  emotional support  
64 y/o w/ no significant PMHx presents w/ alcohol withdrawal. Pt's  contacted over the phone and provided much of the hx. Pt's  endorses that pt has become weaker over the past several months in conjunction with weakness. Pt's  also states that pt's legs have become swollen over the past month in that it has impedes her ability to walk but she is still able to walk unassisted    ashley  weakness  depression  poor historian  anemia  hepatitis    VS noted  meds reviewed  prn valium  placement - ?    ciwa  folic acid  thiamine  SBIRT documentation  assist with needs  monitor mentation  oral hygiene  skin care  trend LFT  nutritional status assessment  CXR on admission [facility]  replete ira  emotional support  
65y female with no significant PMHx presents w/ alcohol withdrawal and leg edema.
66 y/o w/ no significant PMHx presents w/ alcohol withdrawal. Pt's  contacted over the phone and provided much of the hx. Pt's  endorses that pt has become weaker over the past several months in conjunction with weakness. Pt's  also states that pt's legs have become swollen over the past month in that it has impedes her ability to walk but she is still able to walk unassisted    ashley  weakness  depression  poor historian  anemia  hepatitis    VS noted  meds reviewed  prn valium    ciwa  folic acid  thiamine  SBIRT documentation  assist with needs  monitor mentation  oral hygiene  skin care  trend LFT  nutritional status assessment  CXR on admission [facility]  replete ira  emotional support  
66 y/o w/ no significant PMHx presents w/ alcohol withdrawal. Pt's  contacted over the phone and provided much of the hx. Pt's  endorses that pt has become weaker over the past several months in conjunction with weakness. Pt's  also states that pt's legs have become swollen over the past month in that it has impedes her ability to walk but she is still able to walk unassisted    ashley  weakness  depression  poor historian  anemia  hepatitis    VS noted  meds reviewed  prn valium  placement - ? - poss [facility]    ciwa  folic acid  thiamine  SBIRT documentation  assist with needs  monitor mentation  oral hygiene  skin care  trend LFT  nutritional status assessment  CXR on admission [facility]  replete ira  emotional support
65y female with no significant PMHx presents w/ alcohol withdrawal and leg edema.
64 y/o w/ no significant PMHx presents w/ alcohol withdrawal and leg edema. 
65y female with no significant PMHx presents w/ alcohol withdrawal and leg edema.
64 y/o w/ no significant PMHx presents w/ alcohol withdrawal and leg edema. 
65y female with no significant PMHx presents w/ alcohol withdrawal and leg edema.

## 2025-05-30 NOTE — PROGRESS NOTE ADULT - REASON FOR ADMISSION
Alcohol Withdrawal and Leg Edema

## 2025-05-30 NOTE — PROGRESS NOTE ADULT - PROBLEM SELECTOR PLAN 1
Pt presented  with B/L tremors and confusion - REG  -Transaminitis: Alk Phos: 332. AST/ALT: 168/90 - trend CMP - LFTs improved  -CIWA protocol initiated: Valium 5 mg (PRN CIWA 8-14). Valium 10 mg (PRN CIWA >15). Valium taper  -Folic acid, MVA, thiamine  -Alcohol cessation education and counseling   -PT Eval - [facility]  -d/w pt and S.O. Ed - plan is for [facility] - states gave choices to SW  -pt is medically stable for transition to [facility]. d/w IDR

## 2025-05-30 NOTE — PROGRESS NOTE ADULT - PROBLEM SELECTOR PROBLEM 1
Alcohol withdrawal